# Patient Record
Sex: FEMALE | Race: WHITE | NOT HISPANIC OR LATINO | Employment: OTHER | URBAN - METROPOLITAN AREA
[De-identification: names, ages, dates, MRNs, and addresses within clinical notes are randomized per-mention and may not be internally consistent; named-entity substitution may affect disease eponyms.]

---

## 2023-02-17 PROBLEM — M25.471 RIGHT ANKLE SWELLING: Status: ACTIVE | Noted: 2023-02-17

## 2023-02-17 PROBLEM — E66.01 MORBID OBESITY (MULTI): Status: ACTIVE | Noted: 2023-02-17

## 2023-02-17 PROBLEM — L98.9 SKIN LESIONS: Status: ACTIVE | Noted: 2023-02-17

## 2023-02-17 PROBLEM — J34.89 SORE IN NOSTRIL: Status: ACTIVE | Noted: 2023-02-17

## 2023-02-17 PROBLEM — G56.03 BILATERAL CARPAL TUNNEL SYNDROME: Status: ACTIVE | Noted: 2023-02-17

## 2023-02-17 PROBLEM — R06.2 WHEEZING: Status: ACTIVE | Noted: 2023-02-17

## 2023-02-17 PROBLEM — J06.9 ACUTE UPPER RESPIRATORY INFECTION: Status: ACTIVE | Noted: 2023-02-17

## 2023-02-17 PROBLEM — R68.89 FLU-LIKE SYMPTOMS: Status: ACTIVE | Noted: 2023-02-17

## 2023-02-17 PROBLEM — R22.1 LUMP IN THROAT: Status: ACTIVE | Noted: 2023-02-17

## 2023-02-17 PROBLEM — R79.89 ABNORMAL TSH: Status: ACTIVE | Noted: 2023-02-17

## 2023-02-17 PROBLEM — I10 BENIGN ESSENTIAL HYPERTENSION: Status: ACTIVE | Noted: 2023-02-17

## 2023-02-17 PROBLEM — M25.571 RIGHT ANKLE PAIN: Status: ACTIVE | Noted: 2023-02-17

## 2023-02-17 PROBLEM — E66.9 OBESITY: Status: ACTIVE | Noted: 2023-02-17

## 2023-02-17 PROBLEM — J32.9 SINUSITIS: Status: ACTIVE | Noted: 2023-02-17

## 2023-02-17 PROBLEM — E78.5 HYPERLIPIDEMIA: Status: ACTIVE | Noted: 2023-02-17

## 2023-02-17 PROBLEM — N93.9 VAGINAL BLEEDING, ABNORMAL: Status: ACTIVE | Noted: 2023-02-17

## 2023-02-17 PROBLEM — E66.813 CLASS 3 SEVERE OBESITY DUE TO EXCESS CALORIES WITH BODY MASS INDEX (BMI) OF 50.0 TO 59.9 IN ADULT: Status: ACTIVE | Noted: 2023-02-17

## 2023-02-17 PROBLEM — J01.90 ACUTE SINUSITIS: Status: ACTIVE | Noted: 2023-02-17

## 2023-02-17 PROBLEM — T75.3XXA MOTION SICKNESS: Status: ACTIVE | Noted: 2023-02-17

## 2023-02-17 PROBLEM — R73.9 HYPERGLYCEMIA: Status: ACTIVE | Noted: 2023-02-17

## 2023-02-17 PROBLEM — E66.01 CLASS 3 SEVERE OBESITY DUE TO EXCESS CALORIES WITH BODY MASS INDEX (BMI) OF 50.0 TO 59.9 IN ADULT (MULTI): Status: ACTIVE | Noted: 2023-02-17

## 2023-02-17 RX ORDER — HYDROCHLOROTHIAZIDE 25 MG/1
1 TABLET ORAL DAILY
COMMUNITY
Start: 2013-03-16 | End: 2023-08-21

## 2023-02-17 RX ORDER — FLUTICASONE PROPIONATE 50 MCG
2 SPRAY, SUSPENSION (ML) NASAL 2 TIMES DAILY
COMMUNITY

## 2023-02-17 RX ORDER — MELATONIN 10 MG
CAPSULE ORAL
COMMUNITY

## 2023-02-17 RX ORDER — LISINOPRIL 20 MG/1
1 TABLET ORAL DAILY
COMMUNITY
Start: 2018-05-21 | End: 2024-01-08

## 2023-02-17 RX ORDER — ACETAMINOPHEN 500 MG
1 TABLET ORAL DAILY
COMMUNITY
Start: 2015-03-23

## 2023-03-24 LAB
ALANINE AMINOTRANSFERASE (SGPT) (U/L) IN SER/PLAS: 35 U/L (ref 7–45)
ALBUMIN (G/DL) IN SER/PLAS: 4.1 G/DL (ref 3.4–5)
ALKALINE PHOSPHATASE (U/L) IN SER/PLAS: 95 U/L (ref 33–110)
ANION GAP IN SER/PLAS: 15 MMOL/L (ref 10–20)
ASPARTATE AMINOTRANSFERASE (SGOT) (U/L) IN SER/PLAS: 22 U/L (ref 9–39)
BILIRUBIN TOTAL (MG/DL) IN SER/PLAS: 0.7 MG/DL (ref 0–1.2)
CALCIUM (MG/DL) IN SER/PLAS: 9.4 MG/DL (ref 8.6–10.3)
CARBON DIOXIDE, TOTAL (MMOL/L) IN SER/PLAS: 30 MMOL/L (ref 21–32)
CHLORIDE (MMOL/L) IN SER/PLAS: 99 MMOL/L (ref 98–107)
CHOLESTEROL (MG/DL) IN SER/PLAS: 217 MG/DL (ref 0–199)
CHOLESTEROL IN HDL (MG/DL) IN SER/PLAS: 42.8 MG/DL
CHOLESTEROL/HDL RATIO: 5.1
CREATININE (MG/DL) IN SER/PLAS: 0.84 MG/DL (ref 0.5–1.05)
ERYTHROCYTE DISTRIBUTION WIDTH (RATIO) BY AUTOMATED COUNT: 14 % (ref 11.5–14.5)
ERYTHROCYTE MEAN CORPUSCULAR HEMOGLOBIN CONCENTRATION (G/DL) BY AUTOMATED: 32.4 G/DL (ref 32–36)
ERYTHROCYTE MEAN CORPUSCULAR VOLUME (FL) BY AUTOMATED COUNT: 88 FL (ref 80–100)
ERYTHROCYTES (10*6/UL) IN BLOOD BY AUTOMATED COUNT: 5.11 X10E12/L (ref 4–5.2)
GFR FEMALE: 82 ML/MIN/1.73M2
GLUCOSE (MG/DL) IN SER/PLAS: 118 MG/DL (ref 74–99)
HEMATOCRIT (%) IN BLOOD BY AUTOMATED COUNT: 45.1 % (ref 36–46)
HEMOGLOBIN (G/DL) IN BLOOD: 14.6 G/DL (ref 12–16)
LDL: 133 MG/DL (ref 0–99)
LEUKOCYTES (10*3/UL) IN BLOOD BY AUTOMATED COUNT: 8.8 X10E9/L (ref 4.4–11.3)
NON HDL CHOLESTEROL: 174 MG/DL
PLATELETS (10*3/UL) IN BLOOD AUTOMATED COUNT: 317 X10E9/L (ref 150–450)
POTASSIUM (MMOL/L) IN SER/PLAS: 4 MMOL/L (ref 3.5–5.3)
PROTEIN TOTAL: 7 G/DL (ref 6.4–8.2)
SODIUM (MMOL/L) IN SER/PLAS: 140 MMOL/L (ref 136–145)
THYROTROPIN (MIU/L) IN SER/PLAS BY DETECTION LIMIT <= 0.05 MIU/L: 3.81 MIU/L (ref 0.44–3.98)
TRIGLYCERIDE (MG/DL) IN SER/PLAS: 207 MG/DL (ref 0–149)
UREA NITROGEN (MG/DL) IN SER/PLAS: 18 MG/DL (ref 6–23)
VLDL: 41 MG/DL (ref 0–40)

## 2023-03-31 ENCOUNTER — OFFICE VISIT (OUTPATIENT)
Dept: PRIMARY CARE | Facility: CLINIC | Age: 53
End: 2023-03-31
Payer: COMMERCIAL

## 2023-03-31 VITALS
TEMPERATURE: 97.1 F | BODY MASS INDEX: 52.56 KG/M2 | HEIGHT: 61 IN | HEART RATE: 98 BPM | WEIGHT: 278.4 LBS | SYSTOLIC BLOOD PRESSURE: 122 MMHG | RESPIRATION RATE: 16 BRPM | OXYGEN SATURATION: 99 % | DIASTOLIC BLOOD PRESSURE: 80 MMHG

## 2023-03-31 DIAGNOSIS — K76.0 STEATOSIS OF LIVER: Primary | ICD-10-CM

## 2023-03-31 DIAGNOSIS — Z00.00 HEALTHCARE MAINTENANCE: ICD-10-CM

## 2023-03-31 DIAGNOSIS — I10 BENIGN ESSENTIAL HYPERTENSION: ICD-10-CM

## 2023-03-31 DIAGNOSIS — E66.01 CLASS 3 SEVERE OBESITY DUE TO EXCESS CALORIES WITH SERIOUS COMORBIDITY AND BODY MASS INDEX (BMI) OF 50.0 TO 59.9 IN ADULT (MULTI): ICD-10-CM

## 2023-03-31 PROCEDURE — 3008F BODY MASS INDEX DOCD: CPT | Performed by: INTERNAL MEDICINE

## 2023-03-31 PROCEDURE — 3079F DIAST BP 80-89 MM HG: CPT | Performed by: INTERNAL MEDICINE

## 2023-03-31 PROCEDURE — 1036F TOBACCO NON-USER: CPT | Performed by: INTERNAL MEDICINE

## 2023-03-31 PROCEDURE — 99396 PREV VISIT EST AGE 40-64: CPT | Performed by: INTERNAL MEDICINE

## 2023-03-31 PROCEDURE — 3074F SYST BP LT 130 MM HG: CPT | Performed by: INTERNAL MEDICINE

## 2023-03-31 ASSESSMENT — ENCOUNTER SYMPTOMS
ENDOCRINE NEGATIVE: 1
DIARRHEA: 0
HEMATOLOGIC/LYMPHATIC NEGATIVE: 1
POLYDIPSIA: 0
FACIAL ASYMMETRY: 0
AGITATION: 0
DIZZINESS: 0
VOICE CHANGE: 0
EYE DISCHARGE: 0
EYES NEGATIVE: 1
NECK STIFFNESS: 0
JOINT SWELLING: 0
COUGH: 0
NUMBNESS: 0
DIFFICULTY URINATING: 0
DYSURIA: 0
EYE PAIN: 0
CONFUSION: 0
COLOR CHANGE: 0
FLANK PAIN: 0
UNEXPECTED WEIGHT CHANGE: 0
SEIZURES: 0
MUSCULOSKELETAL NEGATIVE: 1
OCCASIONAL FEELINGS OF UNSTEADINESS: 0
RESPIRATORY NEGATIVE: 1
HEADACHES: 0
CARDIOVASCULAR NEGATIVE: 1
PSYCHIATRIC NEGATIVE: 1
ACTIVITY CHANGE: 0
BLOOD IN STOOL: 0
ARTHRALGIAS: 0
DEPRESSION: 0
EYE REDNESS: 0
NERVOUS/ANXIOUS: 0
BRUISES/BLEEDS EASILY: 0
TROUBLE SWALLOWING: 0
MYALGIAS: 0
WEAKNESS: 0
ADENOPATHY: 0
TREMORS: 0
NECK PAIN: 0
NEUROLOGICAL NEGATIVE: 1
FREQUENCY: 0
LIGHT-HEADEDNESS: 0
WHEEZING: 0
NAUSEA: 0
PALPITATIONS: 0
LOSS OF SENSATION IN FEET: 0
VOMITING: 0
APPETITE CHANGE: 0
SPEECH DIFFICULTY: 0
GASTROINTESTINAL NEGATIVE: 1
SLEEP DISTURBANCE: 0
BACK PAIN: 0
SHORTNESS OF BREATH: 0
POLYPHAGIA: 0
SINUS PAIN: 0
HALLUCINATIONS: 0
SORE THROAT: 0
ABDOMINAL PAIN: 0
SINUS PRESSURE: 0
CONSTIPATION: 0
STRIDOR: 0
WOUND: 0
ALLERGIC/IMMUNOLOGIC NEGATIVE: 1
CHEST TIGHTNESS: 0
CONSTITUTIONAL NEGATIVE: 1

## 2023-03-31 ASSESSMENT — PATIENT HEALTH QUESTIONNAIRE - PHQ9
1. LITTLE INTEREST OR PLEASURE IN DOING THINGS: NOT AT ALL
2. FEELING DOWN, DEPRESSED OR HOPELESS: NOT AT ALL
SUM OF ALL RESPONSES TO PHQ9 QUESTIONS 1 AND 2: 0

## 2023-03-31 NOTE — PROGRESS NOTES
Subjective   Patient ID: Lexi Vanegas is a 53 y.o. female who presents for Annual Exam (Patient is here for a physical. ).  HPI    Patient has been feeling pretty good and has been complaint with prescribed medications.    We reviewed and discussed details of recent blood work: CBC, CMP, TSH, Lipid panel in march 2023.  Results within acceptable range.  Elevated glucose, TG, cholesterol noted.     Recent CT CAC score in 2023 was: 88  Additionally liver steatosis noted.     Last mammogram: jan 2023  Last Colonoscopy: 2021, Next 2026  PAP: F/up with GYN Dr. Mosqueda    Review of Systems   Constitutional: Negative.  Negative for activity change, appetite change and unexpected weight change.   HENT: Negative.  Negative for congestion, ear discharge, ear pain, hearing loss, mouth sores, nosebleeds, sinus pressure, sinus pain, sore throat, trouble swallowing and voice change.    Eyes: Negative.  Negative for pain, discharge, redness and visual disturbance.   Respiratory: Negative.  Negative for cough, chest tightness, shortness of breath, wheezing and stridor.    Cardiovascular: Negative.  Negative for chest pain, palpitations and leg swelling.   Gastrointestinal: Negative.  Negative for abdominal pain, blood in stool, constipation, diarrhea, nausea and vomiting.   Endocrine: Negative.  Negative for polydipsia, polyphagia and polyuria.   Genitourinary: Negative.  Negative for difficulty urinating, dysuria, flank pain, frequency and urgency.   Musculoskeletal: Negative.  Negative for arthralgias, back pain, gait problem, joint swelling, myalgias, neck pain and neck stiffness.   Skin: Negative.  Negative for color change, rash and wound.   Allergic/Immunologic: Negative.  Negative for environmental allergies, food allergies and immunocompromised state.   Neurological: Negative.  Negative for dizziness, tremors, seizures, syncope, facial asymmetry, speech difficulty, weakness, light-headedness, numbness and headaches.  "  Hematological: Negative.  Negative for adenopathy. Does not bruise/bleed easily.   Psychiatric/Behavioral: Negative.  Negative for agitation, behavioral problems, confusion, hallucinations, sleep disturbance and suicidal ideas. The patient is not nervous/anxious.    All other systems reviewed and are negative.      Objective     Review of systems was performed and all systems were negative except what in HPI    /80   Pulse 98   Temp 36.2 °C (97.1 °F)   Resp 16   Ht 1.543 m (5' 0.75\")   Wt 126 kg (278 lb 6.4 oz)   SpO2 99%   BMI 53.04 kg/m²    Physical Exam  Vitals and nursing note reviewed.   Constitutional:       General: She is not in acute distress.     Appearance: Normal appearance.   HENT:      Head: Normocephalic and atraumatic.      Right Ear: Tympanic membrane, ear canal and external ear normal.      Left Ear: Tympanic membrane, ear canal and external ear normal.      Nose: Nose normal. No congestion or rhinorrhea.   Eyes:      General:         Right eye: No discharge.         Left eye: No discharge.      Extraocular Movements: Extraocular movements intact.      Conjunctiva/sclera: Conjunctivae normal.      Pupils: Pupils are equal, round, and reactive to light.   Cardiovascular:      Rate and Rhythm: Normal rate and regular rhythm.      Pulses: Normal pulses.      Heart sounds: Normal heart sounds. No murmur heard.     No friction rub. No gallop.   Pulmonary:      Effort: Pulmonary effort is normal. No respiratory distress.      Breath sounds: Normal breath sounds. No stridor. No wheezing, rhonchi or rales.   Chest:      Chest wall: No tenderness.   Abdominal:      General: Bowel sounds are normal.      Palpations: Abdomen is soft. There is no mass.      Tenderness: There is no abdominal tenderness. There is no guarding or rebound.   Musculoskeletal:         General: No swelling or deformity. Normal range of motion.      Cervical back: Normal range of motion and neck supple. No rigidity or " tenderness.      Right lower leg: No edema.      Left lower leg: No edema.   Lymphadenopathy:      Cervical: No cervical adenopathy.   Skin:     General: Skin is warm and dry.      Coloration: Skin is not jaundiced.      Findings: No bruising or erythema.   Neurological:      General: No focal deficit present.      Mental Status: She is alert and oriented to person, place, and time. Mental status is at baseline.      Cranial Nerves: No cranial nerve deficit.      Motor: No weakness.      Coordination: Coordination normal.      Gait: Gait normal.   Psychiatric:         Mood and Affect: Mood normal.         Behavior: Behavior normal.         Thought Content: Thought content normal.         Judgment: Judgment normal.         Assessment/Plan   Problem List Items Addressed This Visit          Circulatory    Benign essential hypertension     Controlled. Continue same medications and DASH diet..            Digestive    Steatosis of liver - Primary    Relevant Orders    US abdomen limited liver       Endocrine/Metabolic    Class 3 severe obesity due to excess calories with body mass index (BMI) of 50.0 to 59.9 in adult (CMS/HCC)     Weight loss is advised. Target BMI: 25. Please follow low carbohydrate diet and exercise at least 150 minutes weekly.  Nutritional consultation is available, please let me know if interested.          BMI 50.0-59.9, adult (CMS/HCC)       Other    Healthcare maintenance     Weight loss is advised. Target BMI: 25. Please follow low carbohydrate diet and exercise at least 150 minutes weekly.  Nutritional consultation is available, please let me know if interested.         It was a pleasure to see you today.  I would like to remind you about importance of a healthy lifestyle in order to improve your well-being and live longer.  Try to engage in physical activities for at least 150 minutes per week.  Eat about 10 servings of fruits and vegetables daily. My advice is 2 servings of fruits and 8  servings of vegetables.  For vegetables choose at least half of them green and at least half of them fresh.  Please avoid sugar, salt, fried food and saturated fat.    F/up in 6 months or sooner if needed.

## 2023-04-01 PROBLEM — K76.0 STEATOSIS OF LIVER: Status: ACTIVE | Noted: 2023-04-01

## 2023-04-01 PROBLEM — Z00.00 HEALTHCARE MAINTENANCE: Status: ACTIVE | Noted: 2023-04-01

## 2023-04-01 NOTE — ASSESSMENT & PLAN NOTE
Weight loss is advised. Target BMI: 25. Please follow low carbohydrate diet and exercise at least 150 minutes weekly.  Nutritional consultation is available, please let me know if interested.

## 2023-05-14 DIAGNOSIS — K80.20 CALCULUS OF GALLBLADDER WITHOUT CHOLECYSTITIS WITHOUT OBSTRUCTION: ICD-10-CM

## 2023-05-14 DIAGNOSIS — K76.0 STEATOSIS OF LIVER: Primary | ICD-10-CM

## 2023-05-15 ENCOUNTER — TELEPHONE (OUTPATIENT)
Dept: PRIMARY CARE | Facility: CLINIC | Age: 53
End: 2023-05-15
Payer: COMMERCIAL

## 2023-05-15 NOTE — TELEPHONE ENCOUNTER
----- Message from Denise Gaming MD PhD sent at 5/14/2023  2:30 PM EDT -----  Please call the patient regarding her abnormal result.  Liver US showed fatty infiltration of liver, liver cyst and gallbladder stones.  Please consult hepatologist and surgeon for further evaluation of fatty liver and stones. I placed referral orders in Epic.

## 2023-08-10 LAB
ALANINE AMINOTRANSFERASE (SGPT) (U/L) IN SER/PLAS: 35 U/L (ref 7–45)
ALBUMIN (G/DL) IN SER/PLAS: 4.2 G/DL (ref 3.4–5)
ALKALINE PHOSPHATASE (U/L) IN SER/PLAS: 102 U/L (ref 33–110)
ASPARTATE AMINOTRANSFERASE (SGOT) (U/L) IN SER/PLAS: 23 U/L (ref 9–39)
BASOPHILS (10*3/UL) IN BLOOD BY AUTOMATED COUNT: 0.09 X10E9/L (ref 0–0.1)
BASOPHILS/100 LEUKOCYTES IN BLOOD BY AUTOMATED COUNT: 0.9 % (ref 0–2)
BILIRUBIN DIRECT (MG/DL) IN SER/PLAS: 0.1 MG/DL (ref 0–0.3)
BILIRUBIN TOTAL (MG/DL) IN SER/PLAS: 0.5 MG/DL (ref 0–1.2)
ERYTHROCYTE DISTRIBUTION WIDTH (RATIO) BY AUTOMATED COUNT: 14.1 % (ref 11.5–14.5)
ERYTHROCYTE MEAN CORPUSCULAR HEMOGLOBIN CONCENTRATION (G/DL) BY AUTOMATED: 34 G/DL (ref 32–36)
ERYTHROCYTE MEAN CORPUSCULAR VOLUME (FL) BY AUTOMATED COUNT: 86 FL (ref 80–100)
ERYTHROCYTES (10*6/UL) IN BLOOD BY AUTOMATED COUNT: 4.97 X10E12/L (ref 4–5.2)
HEMATOCRIT (%) IN BLOOD BY AUTOMATED COUNT: 42.9 % (ref 36–46)
HEMOGLOBIN (G/DL) IN BLOOD: 14.6 G/DL (ref 12–16)
HEPATITIS A TOTAL AB INTERPRETATION: NONREACTIVE
HEPATITIS B VIRUS CORE AB (PRESENCE) IN SER/PLAS BY IMM: NONREACTIVE
HEPATITIS B VIRUS SURFACE AB (MIU/ML) IN SERUM: <3.1 MIU/ML
HEPATITIS B VIRUS SURFACE AG PRESENCE IN SERUM: NONREACTIVE
HEPATITIS C VIRUS AB PRESENCE IN SERUM: NONREACTIVE
IMMATURE GRANULOCYTES/100 LEUKOCYTES IN BLOOD BY AUTOMATED COUNT: 0.2 % (ref 0–0.9)
LEUKOCYTES (10*3/UL) IN BLOOD BY AUTOMATED COUNT: 10.2 X10E9/L (ref 4.4–11.3)
LYMPHOCYTES (10*3/UL) IN BLOOD BY AUTOMATED COUNT: 2.81 X10E9/L (ref 1.2–4.8)
LYMPHOCYTES/100 LEUKOCYTES IN BLOOD BY AUTOMATED COUNT: 27.5 % (ref 13–44)
MONOCYTES (10*3/UL) IN BLOOD BY AUTOMATED COUNT: 0.56 X10E9/L (ref 0.1–1)
MONOCYTES/100 LEUKOCYTES IN BLOOD BY AUTOMATED COUNT: 5.5 % (ref 2–10)
NEUTROPHILS (10*3/UL) IN BLOOD BY AUTOMATED COUNT: 6.72 X10E9/L (ref 1.2–7.7)
NEUTROPHILS/100 LEUKOCYTES IN BLOOD BY AUTOMATED COUNT: 65.9 % (ref 40–80)
PLATELETS (10*3/UL) IN BLOOD AUTOMATED COUNT: 362 X10E9/L (ref 150–450)
PROTEIN TOTAL: 7.5 G/DL (ref 6.4–8.2)

## 2023-08-20 DIAGNOSIS — I10 BENIGN ESSENTIAL HYPERTENSION: Primary | ICD-10-CM

## 2023-08-21 RX ORDER — HYDROCHLOROTHIAZIDE 25 MG/1
25 TABLET ORAL DAILY
Qty: 90 TABLET | Refills: 3 | Status: SHIPPED | OUTPATIENT
Start: 2023-08-21

## 2023-09-29 ENCOUNTER — APPOINTMENT (OUTPATIENT)
Dept: PRIMARY CARE | Facility: CLINIC | Age: 53
End: 2023-09-29
Payer: COMMERCIAL

## 2023-10-06 ENCOUNTER — LAB (OUTPATIENT)
Dept: LAB | Facility: LAB | Age: 53
End: 2023-10-06
Payer: COMMERCIAL

## 2023-10-06 ENCOUNTER — OFFICE VISIT (OUTPATIENT)
Dept: PRIMARY CARE | Facility: CLINIC | Age: 53
End: 2023-10-06
Payer: COMMERCIAL

## 2023-10-06 VITALS
SYSTOLIC BLOOD PRESSURE: 117 MMHG | TEMPERATURE: 97 F | HEIGHT: 61 IN | DIASTOLIC BLOOD PRESSURE: 79 MMHG | WEIGHT: 271 LBS | RESPIRATION RATE: 16 BRPM | BODY MASS INDEX: 51.16 KG/M2 | OXYGEN SATURATION: 97 % | HEART RATE: 77 BPM

## 2023-10-06 DIAGNOSIS — E78.2 MIXED HYPERLIPIDEMIA: ICD-10-CM

## 2023-10-06 DIAGNOSIS — K76.0 STEATOSIS OF LIVER: ICD-10-CM

## 2023-10-06 DIAGNOSIS — Z00.00 HEALTHCARE MAINTENANCE: ICD-10-CM

## 2023-10-06 DIAGNOSIS — I10 BENIGN ESSENTIAL HYPERTENSION: ICD-10-CM

## 2023-10-06 DIAGNOSIS — Z23 NEED FOR INFLUENZA VACCINATION: Primary | ICD-10-CM

## 2023-10-06 DIAGNOSIS — R73.9 HYPERGLYCEMIA: ICD-10-CM

## 2023-10-06 DIAGNOSIS — K76.0 FATTY (CHANGE OF) LIVER, NOT ELSEWHERE CLASSIFIED: Primary | ICD-10-CM

## 2023-10-06 DIAGNOSIS — E66.01 CLASS 3 SEVERE OBESITY DUE TO EXCESS CALORIES WITH SERIOUS COMORBIDITY AND BODY MASS INDEX (BMI) OF 50.0 TO 59.9 IN ADULT (MULTI): ICD-10-CM

## 2023-10-06 PROCEDURE — 0014M MISCELLANEOUS LAB TEST: CPT

## 2023-10-06 PROCEDURE — 3008F BODY MASS INDEX DOCD: CPT | Performed by: INTERNAL MEDICINE

## 2023-10-06 PROCEDURE — 1036F TOBACCO NON-USER: CPT | Performed by: INTERNAL MEDICINE

## 2023-10-06 PROCEDURE — 90471 IMMUNIZATION ADMIN: CPT | Performed by: INTERNAL MEDICINE

## 2023-10-06 PROCEDURE — 36415 COLL VENOUS BLD VENIPUNCTURE: CPT

## 2023-10-06 PROCEDURE — 3074F SYST BP LT 130 MM HG: CPT | Performed by: INTERNAL MEDICINE

## 2023-10-06 PROCEDURE — 99214 OFFICE O/P EST MOD 30 MIN: CPT | Performed by: INTERNAL MEDICINE

## 2023-10-06 PROCEDURE — 90686 IIV4 VACC NO PRSV 0.5 ML IM: CPT | Performed by: INTERNAL MEDICINE

## 2023-10-06 PROCEDURE — 3078F DIAST BP <80 MM HG: CPT | Performed by: INTERNAL MEDICINE

## 2023-10-06 ASSESSMENT — ENCOUNTER SYMPTOMS
LOSS OF SENSATION IN FEET: 0
DEPRESSION: 0
OCCASIONAL FEELINGS OF UNSTEADINESS: 0

## 2023-10-06 ASSESSMENT — PATIENT HEALTH QUESTIONNAIRE - PHQ9
2. FEELING DOWN, DEPRESSED OR HOPELESS: NOT AT ALL
SUM OF ALL RESPONSES TO PHQ9 QUESTIONS 1 AND 2: 0
1. LITTLE INTEREST OR PLEASURE IN DOING THINGS: NOT AT ALL

## 2023-10-06 NOTE — PROGRESS NOTES
Subjective   Patient ID: Lexi Vanegas is a 53 y.o. female who presents for Follow-up (Patient is here for a follow up./No new concerns. ).  HPI    Patient has been feeling pretty good and has been complaint with prescribed medications.    She has been under a lot of family stress due her  illness and surgery.    Patient was diagnosed with fatty liver, saw hepatologist Dr. Walsh in September 2023, weight loss strongly recommended.   I reviewed consultation note.   Patient has been trying to lose weight, she signed up with , has been going to GYM and exercising few times per week, no CP, SOB with exertion         We reviewed and discussed details of recent blood work: CBC, CMP, TSH, Lipid panel, Hb A1c, Vit D done in 2023. Results within acceptable range.    Last colonoscopy:  2021  Last mammogram: 1/2023  CT CAC score: 88 (2022)  UD abd (3/2023): enlarged liver, diffuse steatosis, hepatic cyst, gallbladder stone    Review of Systems   Constitutional: Negative.  Negative for activity change, appetite change and unexpected weight change.   HENT: Negative.  Negative for congestion, ear discharge, ear pain, hearing loss, mouth sores, nosebleeds, sinus pressure, sinus pain, sore throat, trouble swallowing and voice change.    Eyes: Negative.  Negative for pain, discharge, redness and visual disturbance.   Respiratory: Negative.  Negative for cough, chest tightness, shortness of breath, wheezing and stridor.    Cardiovascular: Negative.  Negative for chest pain, palpitations and leg swelling.   Gastrointestinal: Negative.  Negative for abdominal pain, blood in stool, constipation, diarrhea, nausea and vomiting.   Endocrine: Negative.  Negative for polydipsia, polyphagia and polyuria.   Genitourinary: Negative.  Negative for difficulty urinating, dysuria, flank pain, frequency and urgency.   Musculoskeletal: Negative.  Negative for arthralgias, back pain, gait problem, joint swelling, myalgias,  "neck pain and neck stiffness.   Skin: Negative.  Negative for color change, rash and wound.   Allergic/Immunologic: Negative.  Negative for environmental allergies, food allergies and immunocompromised state.   Neurological: Negative.  Negative for dizziness, tremors, seizures, syncope, facial asymmetry, speech difficulty, weakness, light-headedness, numbness and headaches.   Hematological: Negative.  Negative for adenopathy. Does not bruise/bleed easily.   Psychiatric/Behavioral: Negative.  Negative for agitation, behavioral problems, confusion, hallucinations, sleep disturbance and suicidal ideas. The patient is not nervous/anxious.    All other systems reviewed and are negative.      Objective     Review of systems was performed and all systems were negative except what in HPI    /79   Pulse 77   Temp 36.1 °C (97 °F)   Resp 16   Ht 1.543 m (5' 0.75\")   Wt 123 kg (271 lb)   SpO2 97%   BMI 51.63 kg/m²    Physical Exam  Vitals and nursing note reviewed.   Constitutional:       General: She is not in acute distress.     Appearance: Normal appearance.   HENT:      Head: Normocephalic and atraumatic.      Right Ear: External ear normal.      Left Ear: External ear normal.      Nose: Nose normal. No congestion or rhinorrhea.   Eyes:      General:         Right eye: No discharge.         Left eye: No discharge.      Extraocular Movements: Extraocular movements intact.      Conjunctiva/sclera: Conjunctivae normal.      Pupils: Pupils are equal, round, and reactive to light.   Cardiovascular:      Rate and Rhythm: Normal rate and regular rhythm.      Pulses: Normal pulses.      Heart sounds: Normal heart sounds. No murmur heard.     No friction rub. No gallop.   Pulmonary:      Effort: Pulmonary effort is normal. No respiratory distress.      Breath sounds: Normal breath sounds. No stridor. No wheezing, rhonchi or rales.   Chest:      Chest wall: No tenderness.   Abdominal:      General: Bowel sounds are " normal.      Palpations: Abdomen is soft. There is no mass.      Tenderness: There is no abdominal tenderness. There is no guarding or rebound.   Musculoskeletal:         General: No swelling or deformity. Normal range of motion.      Cervical back: Normal range of motion and neck supple. No rigidity or tenderness.      Right lower leg: No edema.      Left lower leg: No edema.   Lymphadenopathy:      Cervical: No cervical adenopathy.   Skin:     General: Skin is warm and dry.      Coloration: Skin is not jaundiced.      Findings: No bruising or erythema.   Neurological:      General: No focal deficit present.      Mental Status: She is alert and oriented to person, place, and time. Mental status is at baseline.      Cranial Nerves: No cranial nerve deficit.      Motor: No weakness.      Coordination: Coordination normal.      Gait: Gait normal.   Psychiatric:         Mood and Affect: Mood normal.         Behavior: Behavior normal.         Thought Content: Thought content normal.         Judgment: Judgment normal.         Assessment/Plan   Problem List Items Addressed This Visit             ICD-10-CM       Cardiac and Vasculature    Benign essential hypertension I10     Controlled. Continue same medications and DASH diet..         Hyperlipidemia E78.5     Low cholesterol and low carbohydrate diet is advised.             Endocrine/Metabolic    Hyperglycemia R73.9     Low cholesterol and low carbohydrate diet is advised.          Relevant Orders    Hemoglobin A1C    Class 3 severe obesity due to excess calories with body mass index (BMI) of 50.0 to 59.9 in adult (CMS/HCC) E66.01, Z68.43     Weight loss is advised. Target BMI: 25. Please follow low carbohydrate diet and exercise at least 150 minutes weekly.  Nutritional consultation is available, please let me know if interested.             Gastrointestinal and Abdominal    Steatosis of liver K76.0     Low cholesterol and low carbohydrate diet is advised.   F/up with  hepatologist Dr. Walsh.            Health Encounters    Healthcare maintenance Z00.00    Relevant Orders    CBC    Comprehensive Metabolic Panel    Lipid Panel    TSH with reflex to Free T4 if abnormal     Other Visit Diagnoses         Codes    Need for influenza vaccination    -  Primary Z23    Relevant Orders    Flu vaccine (IIV4) age 6 months and greater, preservative free (Completed)        It was a pleasure to see you today.  I would like to remind you about importance of a healthy lifestyle in order to improve your well-being and live longer.  Try to engage in physical activities for at least 150 minutes per week.  Eat about 10 servings of fruits and vegetables daily. My advice is 2 servings of fruits and 8 servings of vegetables.  For vegetables choose at least half of them green and at least half of them fresh.  Please avoid sugar, salt, fried food and saturated fat.    F/up in 6 months or sooner if needed.

## 2023-10-08 ASSESSMENT — ENCOUNTER SYMPTOMS
CONFUSION: 0
LIGHT-HEADEDNESS: 0
ABDOMINAL PAIN: 0
UNEXPECTED WEIGHT CHANGE: 0
MYALGIAS: 0
BRUISES/BLEEDS EASILY: 0
ALLERGIC/IMMUNOLOGIC NEGATIVE: 1
EYE PAIN: 0
APPETITE CHANGE: 0
RESPIRATORY NEGATIVE: 1
EYE DISCHARGE: 0
HALLUCINATIONS: 0
CONSTITUTIONAL NEGATIVE: 1
WHEEZING: 0
COUGH: 0
HEADACHES: 0
SINUS PRESSURE: 0
TROUBLE SWALLOWING: 0
ENDOCRINE NEGATIVE: 1
WOUND: 0
DIARRHEA: 0
MUSCULOSKELETAL NEGATIVE: 1
SEIZURES: 0
SHORTNESS OF BREATH: 0
EYE REDNESS: 0
WEAKNESS: 0
POLYPHAGIA: 0
SINUS PAIN: 0
PALPITATIONS: 0
FREQUENCY: 0
FACIAL ASYMMETRY: 0
GASTROINTESTINAL NEGATIVE: 1
BLOOD IN STOOL: 0
AGITATION: 0
ARTHRALGIAS: 0
NUMBNESS: 0
NEUROLOGICAL NEGATIVE: 1
PSYCHIATRIC NEGATIVE: 1
FLANK PAIN: 0
VOICE CHANGE: 0
DIZZINESS: 0
COLOR CHANGE: 0
SORE THROAT: 0
CARDIOVASCULAR NEGATIVE: 1
STRIDOR: 0
POLYDIPSIA: 0
JOINT SWELLING: 0
TREMORS: 0
ADENOPATHY: 0
CONSTIPATION: 0
ACTIVITY CHANGE: 0
CHEST TIGHTNESS: 0
NAUSEA: 0
NERVOUS/ANXIOUS: 0
NECK STIFFNESS: 0
NECK PAIN: 0
SPEECH DIFFICULTY: 0
SLEEP DISTURBANCE: 0
HEMATOLOGIC/LYMPHATIC NEGATIVE: 1
EYES NEGATIVE: 1
BACK PAIN: 0
VOMITING: 0
DIFFICULTY URINATING: 0
DYSURIA: 0

## 2023-10-12 LAB — SCAN RESULT: NORMAL

## 2023-10-31 DIAGNOSIS — K76.0 STEATOSIS OF LIVER: Primary | ICD-10-CM

## 2023-10-31 NOTE — RESULT ENCOUNTER NOTE
Hepatology Nurse Coordinator Note  Called patient to review their most recent results and recommendations from Dr. Walsh. Patient is aware her ELF predicts a low risk score for advanced liver fibrosis. She's aware the fibroscan she had done had predicted F3 fibrosis. She's aware, due to the discrepancy of results, that Dr. Walsh is recommending a liver biopsy to further evaluate fibrosis.  Discussed with patient that if she is reluctant to proceed with the liver biopsy, then Dr. Walsh is recommending LFT check every 6 months with repeat elastography in 1 year.     Patient verbalized that unless Dr. Walsh thinks that the biopsy is completely necessary, she would rather proceed with lab monitoring and a fibroscan, or elastography in the future.  She also asked if she can do it sooner than 1 year. Discussed with Dr. Walsh. Per MD, ok to hold off on biopsy and proceed with blood monitoring and repeat Elastography, but would keep blood work at 6 month check and elastography would be ok to repeat in 9 months from September (previous Fibroscan). Patient made aware.     Patient is aware her labs will be due: February 2024    Patient verbalized understanding of results and recommendations.  Patient was provided RN coordinators contact information should they have any additional questions, or concerns.

## 2024-01-07 DIAGNOSIS — I10 BENIGN ESSENTIAL HYPERTENSION: Primary | ICD-10-CM

## 2024-01-08 RX ORDER — LISINOPRIL 20 MG/1
20 TABLET ORAL DAILY
Qty: 90 TABLET | Refills: 3 | Status: SHIPPED | OUTPATIENT
Start: 2024-01-08

## 2024-03-01 ENCOUNTER — LAB (OUTPATIENT)
Dept: LAB | Facility: LAB | Age: 54
End: 2024-03-01
Payer: COMMERCIAL

## 2024-03-01 DIAGNOSIS — R73.9 HYPERGLYCEMIA: ICD-10-CM

## 2024-03-01 DIAGNOSIS — K76.0 STEATOSIS OF LIVER: ICD-10-CM

## 2024-03-01 DIAGNOSIS — Z00.00 HEALTHCARE MAINTENANCE: ICD-10-CM

## 2024-03-01 LAB
ALBUMIN SERPL BCP-MCNC: 4.4 G/DL (ref 3.4–5)
ALP SERPL-CCNC: 105 U/L (ref 33–110)
ALT SERPL W P-5'-P-CCNC: 30 U/L (ref 7–45)
ANION GAP SERPL CALC-SCNC: 13 MMOL/L (ref 10–20)
AST SERPL W P-5'-P-CCNC: 18 U/L (ref 9–39)
BILIRUB DIRECT SERPL-MCNC: 0.1 MG/DL (ref 0–0.3)
BILIRUB SERPL-MCNC: 0.7 MG/DL (ref 0–1.2)
BUN SERPL-MCNC: 15 MG/DL (ref 6–23)
CALCIUM SERPL-MCNC: 9.5 MG/DL (ref 8.6–10.3)
CHLORIDE SERPL-SCNC: 99 MMOL/L (ref 98–107)
CHOLEST SERPL-MCNC: 240 MG/DL (ref 0–199)
CHOLESTEROL/HDL RATIO: 5
CO2 SERPL-SCNC: 31 MMOL/L (ref 21–32)
CREAT SERPL-MCNC: 0.89 MG/DL (ref 0.5–1.05)
EGFRCR SERPLBLD CKD-EPI 2021: 76 ML/MIN/1.73M*2
ERYTHROCYTE [DISTWIDTH] IN BLOOD BY AUTOMATED COUNT: 14 % (ref 11.5–14.5)
EST. AVERAGE GLUCOSE BLD GHB EST-MCNC: 117 MG/DL
GLUCOSE SERPL-MCNC: 123 MG/DL (ref 74–99)
HBA1C MFR BLD: 5.7 %
HCT VFR BLD AUTO: 46 % (ref 36–46)
HDLC SERPL-MCNC: 47.6 MG/DL
HGB BLD-MCNC: 15.2 G/DL (ref 12–16)
LDLC SERPL CALC-MCNC: 158 MG/DL
MCH RBC QN AUTO: 29.1 PG (ref 26–34)
MCHC RBC AUTO-ENTMCNC: 33 G/DL (ref 32–36)
MCV RBC AUTO: 88 FL (ref 80–100)
NON HDL CHOLESTEROL: 192 MG/DL (ref 0–149)
NRBC BLD-RTO: 0 /100 WBCS (ref 0–0)
PLATELET # BLD AUTO: 359 X10*3/UL (ref 150–450)
POTASSIUM SERPL-SCNC: 4.3 MMOL/L (ref 3.5–5.3)
PROT SERPL-MCNC: 7 G/DL (ref 6.4–8.2)
RBC # BLD AUTO: 5.23 X10*6/UL (ref 4–5.2)
SODIUM SERPL-SCNC: 139 MMOL/L (ref 136–145)
TRIGL SERPL-MCNC: 171 MG/DL (ref 0–149)
TSH SERPL-ACNC: 3.66 MIU/L (ref 0.44–3.98)
VLDL: 34 MG/DL (ref 0–40)
WBC # BLD AUTO: 8.9 X10*3/UL (ref 4.4–11.3)

## 2024-03-01 PROCEDURE — 85027 COMPLETE CBC AUTOMATED: CPT

## 2024-03-01 PROCEDURE — 84443 ASSAY THYROID STIM HORMONE: CPT

## 2024-03-01 PROCEDURE — 82248 BILIRUBIN DIRECT: CPT

## 2024-03-01 PROCEDURE — 80053 COMPREHEN METABOLIC PANEL: CPT

## 2024-03-01 PROCEDURE — 80061 LIPID PANEL: CPT

## 2024-03-01 PROCEDURE — 83036 HEMOGLOBIN GLYCOSYLATED A1C: CPT

## 2024-03-01 PROCEDURE — 36415 COLL VENOUS BLD VENIPUNCTURE: CPT

## 2024-03-08 ENCOUNTER — OFFICE VISIT (OUTPATIENT)
Dept: PRIMARY CARE | Facility: CLINIC | Age: 54
End: 2024-03-08
Payer: COMMERCIAL

## 2024-03-08 VITALS
WEIGHT: 259.8 LBS | HEIGHT: 61 IN | SYSTOLIC BLOOD PRESSURE: 135 MMHG | HEART RATE: 98 BPM | RESPIRATION RATE: 16 BRPM | BODY MASS INDEX: 49.05 KG/M2 | TEMPERATURE: 97 F | OXYGEN SATURATION: 96 % | DIASTOLIC BLOOD PRESSURE: 87 MMHG

## 2024-03-08 DIAGNOSIS — I10 BENIGN ESSENTIAL HYPERTENSION: ICD-10-CM

## 2024-03-08 DIAGNOSIS — K76.0 STEATOSIS OF LIVER: ICD-10-CM

## 2024-03-08 DIAGNOSIS — E78.2 MIXED HYPERLIPIDEMIA: Primary | ICD-10-CM

## 2024-03-08 DIAGNOSIS — E66.01 CLASS 3 SEVERE OBESITY DUE TO EXCESS CALORIES WITH SERIOUS COMORBIDITY AND BODY MASS INDEX (BMI) OF 45.0 TO 49.9 IN ADULT (MULTI): ICD-10-CM

## 2024-03-08 DIAGNOSIS — R73.9 HYPERGLYCEMIA: ICD-10-CM

## 2024-03-08 PROBLEM — J32.9 SINUSITIS: Status: RESOLVED | Noted: 2023-02-17 | Resolved: 2024-03-08

## 2024-03-08 PROBLEM — R22.1 LUMP IN THROAT: Status: RESOLVED | Noted: 2023-02-17 | Resolved: 2024-03-08

## 2024-03-08 PROBLEM — J06.9 ACUTE UPPER RESPIRATORY INFECTION: Status: RESOLVED | Noted: 2023-02-17 | Resolved: 2024-03-08

## 2024-03-08 PROBLEM — J34.89 SORE IN NOSTRIL: Status: RESOLVED | Noted: 2023-02-17 | Resolved: 2024-03-08

## 2024-03-08 PROBLEM — J01.90 ACUTE SINUSITIS: Status: RESOLVED | Noted: 2023-02-17 | Resolved: 2024-03-08

## 2024-03-08 PROCEDURE — 3079F DIAST BP 80-89 MM HG: CPT | Performed by: INTERNAL MEDICINE

## 2024-03-08 PROCEDURE — 1036F TOBACCO NON-USER: CPT | Performed by: INTERNAL MEDICINE

## 2024-03-08 PROCEDURE — 3008F BODY MASS INDEX DOCD: CPT | Performed by: INTERNAL MEDICINE

## 2024-03-08 PROCEDURE — 99214 OFFICE O/P EST MOD 30 MIN: CPT | Performed by: INTERNAL MEDICINE

## 2024-03-08 PROCEDURE — 3075F SYST BP GE 130 - 139MM HG: CPT | Performed by: INTERNAL MEDICINE

## 2024-03-08 ASSESSMENT — ENCOUNTER SYMPTOMS
EYE REDNESS: 0
DIZZINESS: 0
HEMATOLOGIC/LYMPHATIC NEGATIVE: 1
PSYCHIATRIC NEGATIVE: 1
EYE DISCHARGE: 0
PALPITATIONS: 0
OCCASIONAL FEELINGS OF UNSTEADINESS: 0
CONFUSION: 0
SINUS PAIN: 0
SEIZURES: 0
RESPIRATORY NEGATIVE: 1
ENDOCRINE NEGATIVE: 1
VOMITING: 0
HEADACHES: 0
GASTROINTESTINAL NEGATIVE: 1
ARTHRALGIAS: 0
SPEECH DIFFICULTY: 0
ABDOMINAL PAIN: 0
NECK STIFFNESS: 0
NEUROLOGICAL NEGATIVE: 1
BLOOD IN STOOL: 0
ADENOPATHY: 0
BRUISES/BLEEDS EASILY: 0
EYE PAIN: 0
LOSS OF SENSATION IN FEET: 0
CONSTITUTIONAL NEGATIVE: 1
WEAKNESS: 0
WHEEZING: 0
FACIAL ASYMMETRY: 0
FLANK PAIN: 0
CONSTIPATION: 0
JOINT SWELLING: 0
DIFFICULTY URINATING: 0
EYES NEGATIVE: 1
TREMORS: 0
POLYPHAGIA: 0
FREQUENCY: 0
CARDIOVASCULAR NEGATIVE: 1
SORE THROAT: 0
LIGHT-HEADEDNESS: 0
APPETITE CHANGE: 0
SLEEP DISTURBANCE: 0
POLYDIPSIA: 0
NECK PAIN: 0
UNEXPECTED WEIGHT CHANGE: 0
DIARRHEA: 0
ALLERGIC/IMMUNOLOGIC NEGATIVE: 1
NERVOUS/ANXIOUS: 0
SHORTNESS OF BREATH: 0
MYALGIAS: 0
VOICE CHANGE: 0
HALLUCINATIONS: 0
ACTIVITY CHANGE: 0
NAUSEA: 0
MUSCULOSKELETAL NEGATIVE: 1
TROUBLE SWALLOWING: 0
SINUS PRESSURE: 0
DYSURIA: 0
NUMBNESS: 0
CHEST TIGHTNESS: 0
WOUND: 0
AGITATION: 0
DEPRESSION: 0
COLOR CHANGE: 0
BACK PAIN: 0
COUGH: 0
STRIDOR: 0

## 2024-03-08 ASSESSMENT — PATIENT HEALTH QUESTIONNAIRE - PHQ9
1. LITTLE INTEREST OR PLEASURE IN DOING THINGS: NOT AT ALL
SUM OF ALL RESPONSES TO PHQ9 QUESTIONS 1 AND 2: 0
2. FEELING DOWN, DEPRESSED OR HOPELESS: NOT AT ALL

## 2024-03-08 NOTE — PROGRESS NOTES
Subjective   Patient ID: Lexi Vanegas is a 54 y.o. female who presents for Follow-up (Patient is here for a follow up./No new concerns. ).  HPI    Patient has been feeling pretty good and has been complaint with prescribed medications.    We reviewed and discussed details of recent blood work: CBC, CMP, TSH, Lipid panel, Hb A1c done in March 2024  Results within acceptable range except elevated cholesterol, TG and glucose.  hbA1c: 5.7.    Intentionally lost 20 lbs with diet and exercise.     We discussed replacing meat in her diet with fish and plant proteins.    Patient had an episode of dizziness few weeks ago which startd in am after getting up and resolved by noon.  Possibly related to dehydration, she went to gym evening before and did not drink fluids afterwards.    Review of Systems   Constitutional: Negative.  Negative for activity change, appetite change and unexpected weight change.   HENT: Negative.  Negative for congestion, ear discharge, ear pain, hearing loss, mouth sores, nosebleeds, sinus pressure, sinus pain, sore throat, trouble swallowing and voice change.    Eyes: Negative.  Negative for pain, discharge, redness and visual disturbance.   Respiratory: Negative.  Negative for cough, chest tightness, shortness of breath, wheezing and stridor.    Cardiovascular: Negative.  Negative for chest pain, palpitations and leg swelling.   Gastrointestinal: Negative.  Negative for abdominal pain, blood in stool, constipation, diarrhea, nausea and vomiting.   Endocrine: Negative.  Negative for polydipsia, polyphagia and polyuria.   Genitourinary: Negative.  Negative for difficulty urinating, dysuria, flank pain, frequency and urgency.   Musculoskeletal: Negative.  Negative for arthralgias, back pain, gait problem, joint swelling, myalgias, neck pain and neck stiffness.   Skin: Negative.  Negative for color change, rash and wound.   Allergic/Immunologic: Negative.  Negative for environmental allergies, food  "allergies and immunocompromised state.   Neurological: Negative.  Negative for dizziness, tremors, seizures, syncope, facial asymmetry, speech difficulty, weakness, light-headedness, numbness and headaches.   Hematological: Negative.  Negative for adenopathy. Does not bruise/bleed easily.   Psychiatric/Behavioral: Negative.  Negative for agitation, behavioral problems, confusion, hallucinations, sleep disturbance and suicidal ideas. The patient is not nervous/anxious.    All other systems reviewed and are negative.      Objective     Review of systems was performed and all systems were negative except what in HPI    /87   Pulse 98   Temp 36.1 °C (97 °F)   Resp 16   Ht 1.543 m (5' 0.75\")   Wt 118 kg (259 lb 12.8 oz)   SpO2 96%   BMI 49.49 kg/m²    Physical Exam  Vitals and nursing note reviewed.   Constitutional:       General: She is not in acute distress.     Appearance: Normal appearance.   HENT:      Head: Normocephalic and atraumatic.      Right Ear: External ear normal.      Left Ear: External ear normal.      Nose: Nose normal. No congestion or rhinorrhea.   Eyes:      General:         Right eye: No discharge.         Left eye: No discharge.      Extraocular Movements: Extraocular movements intact.      Conjunctiva/sclera: Conjunctivae normal.      Pupils: Pupils are equal, round, and reactive to light.   Cardiovascular:      Rate and Rhythm: Normal rate and regular rhythm.      Pulses: Normal pulses.      Heart sounds: Normal heart sounds. No murmur heard.     No friction rub. No gallop.   Pulmonary:      Effort: Pulmonary effort is normal. No respiratory distress.      Breath sounds: Normal breath sounds. No stridor. No wheezing, rhonchi or rales.   Chest:      Chest wall: No tenderness.   Abdominal:      General: Bowel sounds are normal.      Palpations: Abdomen is soft. There is no mass.      Tenderness: There is no abdominal tenderness. There is no guarding or rebound.   Musculoskeletal:    "      General: No swelling or deformity. Normal range of motion.      Cervical back: Normal range of motion and neck supple. No rigidity or tenderness.      Right lower leg: No edema.      Left lower leg: No edema.   Lymphadenopathy:      Cervical: No cervical adenopathy.   Skin:     General: Skin is warm and dry.      Coloration: Skin is not jaundiced.      Findings: No bruising or erythema.   Neurological:      General: No focal deficit present.      Mental Status: She is alert and oriented to person, place, and time. Mental status is at baseline.      Cranial Nerves: No cranial nerve deficit.      Motor: No weakness.      Coordination: Coordination normal.      Gait: Gait normal.   Psychiatric:         Mood and Affect: Mood normal.         Behavior: Behavior normal.         Thought Content: Thought content normal.         Judgment: Judgment normal.         Assessment/Plan   Problem List Items Addressed This Visit             ICD-10-CM       Cardiac and Vasculature    Benign essential hypertension I10     Controlled. Continue same medications and DASH diet..         Hyperlipidemia - Primary E78.5     Low cholesterol and low carbohydrate diet is advised.          Relevant Orders    Comprehensive Metabolic Panel    Lipid Panel       Endocrine/Metabolic    Hyperglycemia R73.9     Low cholesterol and low carbohydrate diet is advised.          Relevant Orders    Hemoglobin A1C    Obesity E66.9     Weight loss is advised. Target BMI: 25. Please follow low carbohydrate diet and exercise at least 150 minutes weekly.  Nutritional consultation is available, please let me know if interested.             Gastrointestinal and Abdominal    Steatosis of liver K76.0     Low cholesterol and low carbohydrate diet is advised.   F/up with hepatologist Dr. Walsh.          It was a pleasure to see you today.  I would like to remind you about importance of a healthy lifestyle in order to improve your well-being and live longer.  Try to  engage in physical activities for at least 150 minutes per week.  Eat about 10 servings of fruits and vegetables daily. My advice is 2 servings of fruits and 8 servings of vegetables.  For vegetables choose at least half of them green and at least half of them fresh.  Please avoid sugar, salt, fried food and saturated fat.    I spent a total of 30 minutes on the date of service for follow up visit, which included preparing to see the patient, face-to-face patient care, completing clinical documentation, obtaining and/or reviewing separately obtained history, performing a medically appropriate examination, counseling and educating the patient/family/caregiver, ordering medications, tests, or procedures, communicating with other health care providers (not separately reported), independently interpreting results (not separately reported), communicating results to the patient/family/caregiver, and care coordination (not separately reported).    F/up in 6 months or sooner if needed.

## 2024-03-18 ENCOUNTER — TELEMEDICINE (OUTPATIENT)
Dept: PRIMARY CARE | Facility: CLINIC | Age: 54
End: 2024-03-18
Payer: COMMERCIAL

## 2024-03-18 DIAGNOSIS — J01.90 ACUTE NON-RECURRENT SINUSITIS, UNSPECIFIED LOCATION: Primary | ICD-10-CM

## 2024-03-18 PROCEDURE — 99213 OFFICE O/P EST LOW 20 MIN: CPT | Performed by: NURSE PRACTITIONER

## 2024-03-18 PROCEDURE — 1036F TOBACCO NON-USER: CPT | Performed by: NURSE PRACTITIONER

## 2024-03-18 PROCEDURE — 3008F BODY MASS INDEX DOCD: CPT | Performed by: NURSE PRACTITIONER

## 2024-03-18 RX ORDER — AMOXICILLIN AND CLAVULANATE POTASSIUM 875; 125 MG/1; MG/1
875 TABLET, FILM COATED ORAL 2 TIMES DAILY
Qty: 20 TABLET | Refills: 0 | Status: SHIPPED | OUTPATIENT
Start: 2024-03-18 | End: 2024-03-28

## 2024-03-18 ASSESSMENT — ENCOUNTER SYMPTOMS
VOMITING: 0
WHEEZING: 0
SINUS PAIN: 1
COUGH: 1
NAUSEA: 0
DIARRHEA: 1
SWOLLEN GLANDS: 0
SORE THROAT: 1

## 2024-03-18 NOTE — PROGRESS NOTES
Subjective   Patient ID: Lexi Vanegas is a 54 y.o. female who presents for URI ([place] last Thursday pt started having sinus pressure, headache, drainage, facial tenderness, cough, sore throat. ).    URI   This is a new problem. The problem has been gradually worsening. The maximum temperature recorded prior to her arrival was 100.4 - 100.9 F. Associated symptoms include congestion, coughing, diarrhea, a plugged ear sensation, sinus pain and a sore throat. Pertinent negatives include no ear pain, nausea, sneezing, swollen glands, vomiting or wheezing. She has tried decongestant for the symptoms. The treatment provided mild relief.        Review of Systems   HENT:  Positive for congestion, sinus pain and sore throat. Negative for ear pain and sneezing.    Respiratory:  Positive for cough. Negative for wheezing.    Gastrointestinal:  Positive for diarrhea. Negative for nausea and vomiting.     otherwise negative aside from what was mentioned above in HPI.    Objective   There were no vitals taken for this visit.    Physical Exam  Constitutional:       Appearance: Normal appearance.   Neurological:      Mental Status: She is alert.   Psychiatric:         Mood and Affect: Mood normal.         Behavior: Behavior normal.         Thought Content: Thought content normal.         Judgment: Judgment normal.     This visit was completed via telephone/virtual visit. All issues as documented below were discussed and addressed but no physical exam was performed. If it was felt that the patient should be evaluated via  face-to-face office appointment(s) they were directed there. The patient verbally consented to this visit.      Assessment/Plan   Problem List Items Addressed This Visit    None  Visit Diagnoses         Codes    Acute non-recurrent sinusitis, unspecified location    -  Primary J01.90    Relevant Medications    amoxicillin-pot clavulanate (Augmentin) 875-125 mg tablet          Nasal rinses, humification/hot shower  until mucous drains, increase fluid intake aiming for half your body weight in oz of water daily. This will help to thin mucous secretion and replace fluids that have been lost.  Warm, moist air, nasal saline hydration, avoidance of nasal irritants including cigarette smoke.  Sip hot or warm drinks, this may soothe nasal passages  Avoid extremely cold and dry air  Tylenol/Advil for muscle aches and fever.  Warm salt water gargles, throat lozenges and popsicle's for any sore throat.    Return in 1 week if not improving.

## 2024-06-28 ENCOUNTER — TELEMEDICINE (OUTPATIENT)
Dept: PRIMARY CARE | Facility: CLINIC | Age: 54
End: 2024-06-28
Payer: COMMERCIAL

## 2024-06-28 DIAGNOSIS — J01.90 ACUTE NON-RECURRENT SINUSITIS, UNSPECIFIED LOCATION: Primary | ICD-10-CM

## 2024-06-28 DIAGNOSIS — J20.9 ACUTE BRONCHITIS, UNSPECIFIED ORGANISM: ICD-10-CM

## 2024-06-28 PROCEDURE — 3008F BODY MASS INDEX DOCD: CPT | Performed by: STUDENT IN AN ORGANIZED HEALTH CARE EDUCATION/TRAINING PROGRAM

## 2024-06-28 PROCEDURE — 99213 OFFICE O/P EST LOW 20 MIN: CPT | Performed by: STUDENT IN AN ORGANIZED HEALTH CARE EDUCATION/TRAINING PROGRAM

## 2024-06-28 PROCEDURE — 1036F TOBACCO NON-USER: CPT | Performed by: STUDENT IN AN ORGANIZED HEALTH CARE EDUCATION/TRAINING PROGRAM

## 2024-06-28 RX ORDER — METHYLPREDNISOLONE 4 MG/1
TABLET ORAL
Qty: 21 TABLET | Refills: 0 | Status: SHIPPED | OUTPATIENT
Start: 2024-06-28 | End: 2024-07-05

## 2024-06-28 RX ORDER — ALBUTEROL SULFATE 90 UG/1
1-2 AEROSOL, METERED RESPIRATORY (INHALATION) EVERY 4 HOURS PRN
Qty: 18 G | Refills: 0 | Status: SHIPPED | OUTPATIENT
Start: 2024-06-28 | End: 2025-06-28

## 2024-06-28 RX ORDER — AMOXICILLIN AND CLAVULANATE POTASSIUM 875; 125 MG/1; MG/1
875 TABLET, FILM COATED ORAL 2 TIMES DAILY
Qty: 14 TABLET | Refills: 0 | Status: SHIPPED | OUTPATIENT
Start: 2024-06-28 | End: 2024-07-05

## 2024-06-28 ASSESSMENT — ENCOUNTER SYMPTOMS
LIGHT-HEADEDNESS: 0
NAUSEA: 1
FEVER: 0
VOMITING: 0
DIARRHEA: 0
CHEST TIGHTNESS: 0
WHEEZING: 0
SINUS PRESSURE: 1
SHORTNESS OF BREATH: 0
COUGH: 1
DIAPHORESIS: 1
CHILLS: 0
SORE THROAT: 1
DIZZINESS: 0
SINUS PAIN: 1
RHINORRHEA: 1

## 2024-06-28 NOTE — PROGRESS NOTES
Subjective   Patient ID: Lexi Vanegas is a 54 y.o. female who presents for URI (X5 days pt has c.o cough, sinus pressure, drainage, throat irritation from PND./Neg covid at home yesterday ).  URI   Associated symptoms include coughing (mild sputum production clearish.), nausea (a/w PND), rhinorrhea, sinus pain and a sore throat (a/w PND). Pertinent negatives include no chest pain, diarrhea, ear pain (resolved and was hurting earlier in the week), vomiting or wheezing.       Virtual or Telephone Consent    An interactive audio and video telecommunication system which permits real time communications between the patient (at the originating site) and provider (at the distant site) was utilized to provide this telehealth service.   Verbal consent was requested and obtained from Lexi Vanegas on this date, 06/28/24 for a telehealth visit.     5 days of cough, sinus pressure, drainage, throat irritation with post nasal drip. Sudafed hasn't helped. She went to brother in law's group home party 6 days ago and people there have had sinus infections, pneumonia, bronchitis. She also has sick contacts at work. Home covid test was negative. Watery eyes and maxillary and frontal sinus pain and pressure.     Review of Systems   Constitutional:  Positive for diaphoresis (mild sweats at night). Negative for chills and fever.        +Mild subjective fever but did not take temp     HENT:  Positive for postnasal drip, rhinorrhea, sinus pressure, sinus pain and sore throat (a/w PND). Negative for ear discharge, ear pain (resolved and was hurting earlier in the week) and hearing loss.    Eyes:  Negative for visual disturbance.   Respiratory:  Positive for cough (mild sputum production clearish.). Negative for chest tightness, shortness of breath and wheezing.         Chest aching with coughing     Cardiovascular:  Negative for chest pain.   Gastrointestinal:  Positive for nausea (a/w PND). Negative for diarrhea and vomiting.    Neurological:  Negative for dizziness, syncope and light-headedness.       There were no vitals taken for this visit.    Objective   Physical Exam  Constitutional:       General: She is not in acute distress.  Pulmonary:      Effort: No respiratory distress.      Comments: Intermittent cough present  Neurological:      Mental Status: She is alert.         Assessment/Plan   Problem List Items Addressed This Visit    None  Visit Diagnoses       Acute non-recurrent sinusitis, unspecified location    -  Primary    Relevant Medications    amoxicillin-pot clavulanate (Augmentin) 875-125 mg tablet    Acute bronchitis, unspecified organism        Relevant Medications    methylPREDNISolone (Medrol Dospak) 4 mg tablets    albuterol 90 mcg/actuation inhaler        Acute sinusitis  Acute bronchitis  - Discussed symptoms seem most consistent with sinusitis, there could be component of bronchitis or possibly emphysema given the distant smoking history  - Augmentin sent to patient's pharmacy, can take a probiotic or yogurt to help with GI side effects  - Discussed to try Medrol pack in a few days if not much better.  Discussed can increase appetite, cause insomnia, agitation though helps reduce inflammation and may help with her symptoms.  - Albuterol as needed sent to pharmacy, discussed can cause heart racing and anxiety/jitteriness.  - ER for shortness of breath, chest pain, worsening or concerning signs or symptoms, pulse ox less than 90%  - Follow-up 1 to 2 weeks in person if not much better.  Otherwise follow-up with Dr. Fontenot as previously scheduled.

## 2024-07-08 ENCOUNTER — TELEPHONE (OUTPATIENT)
Dept: PRIMARY CARE | Facility: CLINIC | Age: 54
End: 2024-07-08
Payer: COMMERCIAL

## 2024-07-08 NOTE — TELEPHONE ENCOUNTER
Pt did a virtual with Dr. Gage and finished the antibiotic and steroid and is still having sinus issues and wanted to know what she can take OTC to help. Okay to leave a message

## 2024-07-24 ENCOUNTER — OFFICE VISIT (OUTPATIENT)
Dept: PRIMARY CARE | Facility: CLINIC | Age: 54
End: 2024-07-24
Payer: COMMERCIAL

## 2024-07-24 VITALS
HEIGHT: 61 IN | OXYGEN SATURATION: 99 % | HEART RATE: 96 BPM | TEMPERATURE: 97.4 F | DIASTOLIC BLOOD PRESSURE: 74 MMHG | RESPIRATION RATE: 16 BRPM | BODY MASS INDEX: 49.28 KG/M2 | WEIGHT: 261 LBS | SYSTOLIC BLOOD PRESSURE: 120 MMHG

## 2024-07-24 DIAGNOSIS — J01.10 ACUTE NON-RECURRENT FRONTAL SINUSITIS: Primary | ICD-10-CM

## 2024-07-24 PROCEDURE — 3078F DIAST BP <80 MM HG: CPT | Performed by: NURSE PRACTITIONER

## 2024-07-24 PROCEDURE — 99213 OFFICE O/P EST LOW 20 MIN: CPT | Performed by: NURSE PRACTITIONER

## 2024-07-24 PROCEDURE — 3074F SYST BP LT 130 MM HG: CPT | Performed by: NURSE PRACTITIONER

## 2024-07-24 PROCEDURE — 3008F BODY MASS INDEX DOCD: CPT | Performed by: NURSE PRACTITIONER

## 2024-07-24 PROCEDURE — 1036F TOBACCO NON-USER: CPT | Performed by: NURSE PRACTITIONER

## 2024-07-24 RX ORDER — DOXYCYCLINE 100 MG/1
100 CAPSULE ORAL 2 TIMES DAILY
Qty: 20 CAPSULE | Refills: 0 | Status: SHIPPED | OUTPATIENT
Start: 2024-07-24 | End: 2024-08-03

## 2024-07-24 NOTE — PROGRESS NOTES
"Subjective   Patient ID: Lexi Vanegas is a 54 y.o. female who presents for URI (X3 weeks pt has c.o cough, wheezing, sinus pressure, drainage, headache. Has been on antibiotics and steroids with some help ).    Patient of Dr. Fontenot.    She was initially treated with Augmentin x 7 days and steroid for URI.  Started to get better but never fully resolved. Was told to use Flonase which she started using astepro instead and been using twice daily and not helping. Her head feels very heavy now with lots of sinus pain, pressure and drainage. Continues to cough from post nasal drainage.    URI          Review of Systems  otherwise negative aside from what was mentioned above in HPI.    Objective   /74   Pulse 96   Temp 36.3 °C (97.4 °F) (Temporal)   Resp 16   Ht 1.543 m (5' 0.75\")   Wt 118 kg (261 lb)   SpO2 99%   BMI 49.72 kg/m²     Physical Exam  Constitutional:       Appearance: Normal appearance.   Cardiovascular:      Rate and Rhythm: Normal rate and regular rhythm.   Pulmonary:      Effort: Pulmonary effort is normal.      Breath sounds: Normal breath sounds.   Abdominal:      General: Abdomen is flat.   Skin:     General: Skin is warm.   Neurological:      General: No focal deficit present.      Mental Status: She is alert and oriented to person, place, and time. Mental status is at baseline.   Psychiatric:         Mood and Affect: Mood normal.         Behavior: Behavior normal.         Thought Content: Thought content normal.         Judgment: Judgment normal.         Assessment/Plan   Problem List Items Addressed This Visit    None  Visit Diagnoses         Codes    Acute non-recurrent frontal sinusitis    -  Primary J01.10    Relevant Medications    doxycycline (Vibramycin) 100 mg capsule        Switch from astepro to FlonaseNasal rinses, humification/hot shower until mucous drains, increase fluid intake aiming for half your body weight in oz of water daily. This will help to thin mucous secretion " and replace fluids that have been lost.  Warm, moist air, nasal saline hydration, avoidance of nasal irritants including cigarette smoke.  Sip hot or warm drinks, this may soothe nasal passages  Avoid extremely cold and dry air  Tylenol/Advil for muscle aches and fever.  Warm salt water gargles, throat lozenges and popsicle's for any sore throat.    Return in 1 week if not improving.

## 2024-08-23 ENCOUNTER — APPOINTMENT (OUTPATIENT)
Dept: GASTROENTEROLOGY | Facility: CLINIC | Age: 54
End: 2024-08-23
Payer: COMMERCIAL

## 2024-09-02 DIAGNOSIS — I10 BENIGN ESSENTIAL HYPERTENSION: ICD-10-CM

## 2024-09-02 RX ORDER — HYDROCHLOROTHIAZIDE 25 MG/1
25 TABLET ORAL DAILY
Qty: 90 TABLET | Refills: 3 | Status: SHIPPED | OUTPATIENT
Start: 2024-09-02

## 2024-09-20 ENCOUNTER — APPOINTMENT (OUTPATIENT)
Dept: PRIMARY CARE | Facility: CLINIC | Age: 54
End: 2024-09-20
Payer: COMMERCIAL

## 2024-09-20 ENCOUNTER — CLINICAL SUPPORT (OUTPATIENT)
Dept: GASTROENTEROLOGY | Facility: CLINIC | Age: 54
End: 2024-09-20
Payer: COMMERCIAL

## 2024-09-20 DIAGNOSIS — K76.0 STEATOSIS OF LIVER: ICD-10-CM

## 2024-09-20 PROCEDURE — 91200 LIVER ELASTOGRAPHY: CPT | Performed by: STUDENT IN AN ORGANIZED HEALTH CARE EDUCATION/TRAINING PROGRAM

## 2024-09-27 ENCOUNTER — LAB (OUTPATIENT)
Dept: LAB | Facility: LAB | Age: 54
End: 2024-09-27
Payer: COMMERCIAL

## 2024-09-27 ENCOUNTER — TELEPHONE (OUTPATIENT)
Dept: GASTROENTEROLOGY | Facility: HOSPITAL | Age: 54
End: 2024-09-27

## 2024-09-27 DIAGNOSIS — K76.0 STEATOSIS OF LIVER: ICD-10-CM

## 2024-09-27 LAB
ALBUMIN SERPL BCP-MCNC: 4.2 G/DL (ref 3.4–5)
ALP SERPL-CCNC: 96 U/L (ref 33–110)
ALT SERPL W P-5'-P-CCNC: 23 U/L (ref 7–45)
AST SERPL W P-5'-P-CCNC: 16 U/L (ref 9–39)
BILIRUB DIRECT SERPL-MCNC: 0.1 MG/DL (ref 0–0.3)
BILIRUB SERPL-MCNC: 0.5 MG/DL (ref 0–1.2)
PROT SERPL-MCNC: 6.5 G/DL (ref 6.4–8.2)

## 2024-09-27 PROCEDURE — 36415 COLL VENOUS BLD VENIPUNCTURE: CPT

## 2024-09-27 PROCEDURE — 80076 HEPATIC FUNCTION PANEL: CPT

## 2024-09-27 NOTE — TELEPHONE ENCOUNTER
Hepatology Nurse Coordinator Note - ADDENDUM  9/27/2024 10:59 AM  Called patient to review their most recent results and recommendations from Dr. Walsh. Provided education on fibrosis scale for fibroscan. Patient is aware her fibroscan demonstrates F0-1 fibrosis, which rules out significant fibrosis. She's aware there is evidence of steatosis.      Patient got her blood work today. She's aware Dr. Walsh will review all results in her upcoming appointment.     Patient verbalized understanding.       ----- Message from Ino Walsh sent at 9/20/2024 12:53 PM EDT -----  Please advise pt that fibroscan rules out risk of significant fibrosis- F0-1. Evidence of steatosis. Please advise pt to have LFT and ELF check done. Visit upcoming- Oct 1, 2024.    Self harming behaviour

## 2024-09-29 NOTE — PROGRESS NOTES
Hepatology: Follow up Office Note     Patient: Lexi Vanegas, a 54 y.o. year old female presents for follow up of steatotic liver disease.   PCP: Denise Gaming MD PhD    History of Present Illness   Lexi Vanegas presents for follow up of steatotic liver disease.     Initial visit in Aug 2023. She underwent a CT cardiac scoring test in December 2022 which picked up on findings of hepatic steatosis. After this she underwent testing with an ultrasound liver which also noted evidence of hepatic steatosis. No prior hx of liver ds or elevated liver enzymes. Pt felt well, without symptoms.      Today's visit: Here for a follow up visit.   After her initial visit last year, patient underwent further diagnostic testing.  This included fibrosis assessment with noninvasive testing.  Last year's FibroScan estimated stage III fibrosis and ELF predicted low risk score.  Given the discrepancy, liver biopsy was discussed, patient chose to defer invasive testing at the time.  She underwent a repeat FibroScan this year and had labs done recently. ELF is pending from labs. Feels well. Denies acute symptoms.      Review of Systems   Constitutional/ General: No fever, no night sweats, no weight loss  Eyes: no yellow discoloration  ENT: normal   Cardiovascular: no chest pain, no palpitations  Respiratory: no shortness of breath  Gastrointestinal: denies abdominal pain, nausea, vomiting  Integumentary: no rashes, no yellow discoloration of skin  Neurologic: no weakness or numbness of extremities  Psychiatric: no mood fluctuations  Musculoskeletal: no joint swelling   Genitourinary: no dysuria, no hematuria    All other systems have been reviewed and are negative except as noted in the HPI and above.    PMHx: HTN, HLD  Family history: denies known hx of cirrhosis or HCC in the family.   Social history: denies hx of alcohol use, prior hx of smoking, denies history of illicit substance use  PSH: denies hx of intra-abdominal  Patient called requesting his medication get refilled today.  If it's not going to, please call patient back to discuss further.   surgeries    Medications     Current Outpatient Medications   Medication Instructions    albuterol 90 mcg/actuation inhaler 1-2 puffs, inhalation, Every 4 hours PRN    cholecalciferol (Vitamin D-3) 50 mcg (2,000 unit) capsule 1 capsule, oral, Daily    fluticasone (Flonase) 50 mcg/actuation nasal spray 2 sprays, Each Nostril, 2 times daily    hydroCHLOROthiazide (HYDRODIURIL) 25 mg, oral, Daily    lisinopril 20 mg, oral, Daily    melatonin 10 mg capsule Melatonin 10 MG Oral Capsule   Refills: 0       Active      Physical Examination     Vitals:    10/01/24 1305   BP: 123/87   Pulse: 80   Temp: 36.5 °C (97.7 °F)     Vitals:    10/01/24 1305   Weight: 119 kg (263 lb)     Body mass index is 43.77 kg/m².    Constitutional: awake, alert   Eyes: EOMI, anicteric sclera  ENT: no oropharyngeal lesions visualized  Respiratory: Bilateral air entry equal no wheezing  Cardiovascular: regular rate and rhythm, no lower extremity edema  Abdomen: soft, non tender, non distended, bowel sounds present  Neurologic: gross motor strength intact   Psychiatric: alert, appropriate mood and affect, oriented to time/place/person    Labs     Lab Results   Component Value Date     03/01/2024    K 4.3 03/01/2024    CREATININE 0.89 03/01/2024    ALBUMIN 4.2 09/27/2024    ALT 23 09/27/2024    ALT 30 03/01/2024    ALT 35 08/10/2023    AST 16 09/27/2024    AST 18 03/01/2024    AST 23 08/10/2023    ALKPHOS 96 09/27/2024    ALKPHOS 105 03/01/2024    ALKPHOS 102 08/10/2023    HGB 15.2 03/01/2024     03/01/2024 Sept 2024: ELF result pending  Oct 2023: ELF 9.36    FIB-4 Calculation: 0.51 at 3/1/2024  8:04 AM  Calculated from:  SGOT/AST: 18 U/L at 3/1/2024  8:04 AM  SGPT/ALT: 30 U/L at 3/1/2024  8:04 AM  Platelets: 359 x10*3/uL at 3/1/2024  8:04 AM  Age: 54 years    Lab Results   Component Value Date    HEPBSAB <3.1 08/10/2023    HEPBCAB NONREACTIVE 08/10/2023    HEPCAB NONREACTIVE 08/10/2023     March 2023: ALT 35, AST 22, ALP 95, Bili  0.7, Na 140, creat 0.84, Plt 317, Hb 14.6, , Chol 217, HDL 41,  [FIB-4 from March 2023: 0.65]  March 2022: ALT 33, AST 20, ALP 92, Bili 0.6    Imaging   US liver May 2023: hepatomegaly with diffuse hepatic steatosis. 1.6cm right hepatic lobe simple cyst. Cholelithiasis without evidence of cholecystitis.     FibroSCAN Sept 2023: E 10.1kPa, IQR 11%. .   FibroSCAN Sept 2024: E 7.3kPa, IQR 13%. .     Assessment and Plan    Lexi Vanegas, a 54 y.o. year old female presents for follow up of steatotic liver disease. I have reviewed pertinent provider notes, labs and imaging studies. Discussed results and their interpretation with the patient today.    Encounter Diagnosis   Name Primary?    Metabolic dysfunction-associated steatotic liver disease (MASLD) Yes     Orders Placed This Encounter   Procedures    Hepatic Function Panel      # MASLD  The etiology of hepatic steatosis resulting in hepatomegaly noted on ultrasound liver imaging is likely secondary to metabolism associated steatotic liver disease.   Liver enzymes within normal range on most recent labs, and prior trend with normal to mild ALT elevation (<1.5 xULN)  She does not appear to have overt symptoms or signs of decompensated liver disease on evaluation today.  FIB-4 score is low.  Prior VCE with elevated LSM, predicting stage III fibrosis in 2023.  Given elevated BMI, limitations in use of VCE for accurate prediction of LSM.  ELF with a low risk score in 2023.  Patient underwent repeat NIT this year with VCE predicting no significant or advanced fibrosis.  ELF results pending.     -Recommend intermittent surveillance of liver enzymes every 6 to 12 monthly.      Discussed with patient pathophysiology of metabolic dysfunction associated steatotic liver disease. Discussed potential of disease progression to steatohepatitis, liver fibrosis, cirrhosis and risk of HCC.   Encourage patient to target weight loss of at least 7 to 10% body  weight in the next 6 to 12 months.      Discussed dietary modifications for fatty liver disease, increasing proportion of grains and vegetables, opting for leaner proteins and decreasing proportion of simple carbohydrates. Discussed exclusion/elimination of poor caloric value foods including sodas, cakes, candy, ice cream, crackers, chips etc. She has deferred consultation with nutritionist.    Patient is advised to avoid hepatotoxic agents including herbal supplements, alcohol use.     Follow up in 1 year.

## 2024-10-01 ENCOUNTER — OFFICE VISIT (OUTPATIENT)
Dept: GASTROENTEROLOGY | Facility: CLINIC | Age: 54
End: 2024-10-01
Payer: COMMERCIAL

## 2024-10-01 VITALS
HEART RATE: 80 BPM | TEMPERATURE: 97.7 F | BODY MASS INDEX: 43.82 KG/M2 | DIASTOLIC BLOOD PRESSURE: 87 MMHG | HEIGHT: 65 IN | WEIGHT: 263 LBS | SYSTOLIC BLOOD PRESSURE: 123 MMHG

## 2024-10-01 DIAGNOSIS — K76.0 METABOLIC DYSFUNCTION-ASSOCIATED STEATOTIC LIVER DISEASE (MASLD): Primary | ICD-10-CM

## 2024-10-01 PROCEDURE — 3008F BODY MASS INDEX DOCD: CPT | Performed by: STUDENT IN AN ORGANIZED HEALTH CARE EDUCATION/TRAINING PROGRAM

## 2024-10-01 PROCEDURE — 99213 OFFICE O/P EST LOW 20 MIN: CPT | Performed by: STUDENT IN AN ORGANIZED HEALTH CARE EDUCATION/TRAINING PROGRAM

## 2024-10-01 PROCEDURE — 3074F SYST BP LT 130 MM HG: CPT | Performed by: STUDENT IN AN ORGANIZED HEALTH CARE EDUCATION/TRAINING PROGRAM

## 2024-10-01 PROCEDURE — 3079F DIAST BP 80-89 MM HG: CPT | Performed by: STUDENT IN AN ORGANIZED HEALTH CARE EDUCATION/TRAINING PROGRAM

## 2024-10-01 NOTE — PATIENT INSTRUCTIONS
Lexi Vanegas,     Thank you for coming in for your hepatology office visit today.   As per our discussion, I recommend:     Pending results of the labs- ELF, will advise you on results and recommendations.   I anticipate lab check in 6 months.   Continue to implement nutritional and life style modification for fatty liver management. Ideally a weight loss goal of 7-10% in the next 12 months would be advised for fatty liver.     I would like to see you back in the office in 1 year.   If you are not provided with a date for your follow up visit at the end of your encounter today at the check out desk, I would encourage you to call [phone] to schedule your appointment with me.   If you have any trouble or need assistance with having this done, please reach out to my office staff at [phone] or my nurse coordinator at [phone] (Ms Bharati FUENTES).     I look forward to seeing you again. Thank you for allowing me to participate in your care.     Sincerely,  Ino Walsh MD  Hepatology

## 2024-10-03 LAB — SCAN RESULT: NORMAL

## 2024-10-04 ENCOUNTER — LAB (OUTPATIENT)
Dept: LAB | Facility: LAB | Age: 54
End: 2024-10-04
Payer: COMMERCIAL

## 2024-10-04 DIAGNOSIS — R73.9 HYPERGLYCEMIA: ICD-10-CM

## 2024-10-04 DIAGNOSIS — E78.2 MIXED HYPERLIPIDEMIA: ICD-10-CM

## 2024-10-04 LAB
ALBUMIN SERPL BCP-MCNC: 4.4 G/DL (ref 3.4–5)
ALP SERPL-CCNC: 93 U/L (ref 33–110)
ALT SERPL W P-5'-P-CCNC: 26 U/L (ref 7–45)
ANION GAP SERPL CALC-SCNC: 15 MMOL/L (ref 10–20)
AST SERPL W P-5'-P-CCNC: 17 U/L (ref 9–39)
BILIRUB SERPL-MCNC: 0.6 MG/DL (ref 0–1.2)
BUN SERPL-MCNC: 17 MG/DL (ref 6–23)
CALCIUM SERPL-MCNC: 9.5 MG/DL (ref 8.6–10.3)
CHLORIDE SERPL-SCNC: 101 MMOL/L (ref 98–107)
CHOLEST SERPL-MCNC: 244 MG/DL (ref 0–199)
CHOLESTEROL/HDL RATIO: 5.4
CO2 SERPL-SCNC: 27 MMOL/L (ref 21–32)
CREAT SERPL-MCNC: 0.81 MG/DL (ref 0.5–1.05)
EGFRCR SERPLBLD CKD-EPI 2021: 85 ML/MIN/1.73M*2
EST. AVERAGE GLUCOSE BLD GHB EST-MCNC: 117 MG/DL
GLUCOSE SERPL-MCNC: 132 MG/DL (ref 74–99)
HBA1C MFR BLD: 5.7 %
HDLC SERPL-MCNC: 45.3 MG/DL
LDLC SERPL CALC-MCNC: 163 MG/DL
NON HDL CHOLESTEROL: 199 MG/DL (ref 0–149)
POTASSIUM SERPL-SCNC: 4 MMOL/L (ref 3.5–5.3)
PROT SERPL-MCNC: 7 G/DL (ref 6.4–8.2)
SODIUM SERPL-SCNC: 139 MMOL/L (ref 136–145)
TRIGL SERPL-MCNC: 178 MG/DL (ref 0–149)
VLDL: 36 MG/DL (ref 0–40)

## 2024-10-04 PROCEDURE — 80053 COMPREHEN METABOLIC PANEL: CPT

## 2024-10-04 PROCEDURE — 83036 HEMOGLOBIN GLYCOSYLATED A1C: CPT

## 2024-10-04 PROCEDURE — 80061 LIPID PANEL: CPT

## 2024-10-04 PROCEDURE — 36415 COLL VENOUS BLD VENIPUNCTURE: CPT

## 2024-10-06 NOTE — RESULT ENCOUNTER NOTE
Your recent lab work was acceptable except elevated glucose, Hba1c, cholesterol, Tg.  Low cholesterol and low carbohydrate diet is advised.   All results may not be within normal range but they are not clinically significant at this time and do not require change in your therapy. We will discuss details during your next office visit. Please keep your next appointment as scheduled. Dr. Fontenot

## 2024-10-11 ENCOUNTER — APPOINTMENT (OUTPATIENT)
Dept: PRIMARY CARE | Facility: CLINIC | Age: 54
End: 2024-10-11
Payer: COMMERCIAL

## 2024-10-11 VITALS
HEART RATE: 98 BPM | TEMPERATURE: 98 F | OXYGEN SATURATION: 99 % | WEIGHT: 264.2 LBS | SYSTOLIC BLOOD PRESSURE: 132 MMHG | DIASTOLIC BLOOD PRESSURE: 60 MMHG | BODY MASS INDEX: 44.02 KG/M2 | RESPIRATION RATE: 16 BRPM | HEIGHT: 65 IN

## 2024-10-11 DIAGNOSIS — E66.813 CLASS 3 SEVERE OBESITY DUE TO EXCESS CALORIES WITH SERIOUS COMORBIDITY AND BODY MASS INDEX (BMI) OF 50.0 TO 59.9 IN ADULT: ICD-10-CM

## 2024-10-11 DIAGNOSIS — Z23 NEED FOR INFLUENZA VACCINATION: ICD-10-CM

## 2024-10-11 DIAGNOSIS — K76.0 STEATOSIS OF LIVER: ICD-10-CM

## 2024-10-11 DIAGNOSIS — I10 BENIGN ESSENTIAL HYPERTENSION: ICD-10-CM

## 2024-10-11 DIAGNOSIS — E66.01 CLASS 3 SEVERE OBESITY DUE TO EXCESS CALORIES WITH SERIOUS COMORBIDITY AND BODY MASS INDEX (BMI) OF 50.0 TO 59.9 IN ADULT: ICD-10-CM

## 2024-10-11 DIAGNOSIS — R73.9 HYPERGLYCEMIA: ICD-10-CM

## 2024-10-11 DIAGNOSIS — Z00.00 HEALTHCARE MAINTENANCE: Primary | ICD-10-CM

## 2024-10-11 DIAGNOSIS — E78.2 MIXED HYPERLIPIDEMIA: ICD-10-CM

## 2024-10-11 PROCEDURE — 3078F DIAST BP <80 MM HG: CPT | Performed by: INTERNAL MEDICINE

## 2024-10-11 PROCEDURE — 90471 IMMUNIZATION ADMIN: CPT | Performed by: INTERNAL MEDICINE

## 2024-10-11 PROCEDURE — 99396 PREV VISIT EST AGE 40-64: CPT | Performed by: INTERNAL MEDICINE

## 2024-10-11 PROCEDURE — 90656 IIV3 VACC NO PRSV 0.5 ML IM: CPT | Performed by: INTERNAL MEDICINE

## 2024-10-11 PROCEDURE — 93000 ELECTROCARDIOGRAM COMPLETE: CPT | Performed by: INTERNAL MEDICINE

## 2024-10-11 PROCEDURE — 3075F SYST BP GE 130 - 139MM HG: CPT | Performed by: INTERNAL MEDICINE

## 2024-10-11 PROCEDURE — 3008F BODY MASS INDEX DOCD: CPT | Performed by: INTERNAL MEDICINE

## 2024-10-11 ASSESSMENT — ENCOUNTER SYMPTOMS
ABDOMINAL PAIN: 0
LIGHT-HEADEDNESS: 0
CONFUSION: 0
CONSTITUTIONAL NEGATIVE: 1
NECK STIFFNESS: 0
MUSCULOSKELETAL NEGATIVE: 1
VOICE CHANGE: 0
EYE DISCHARGE: 0
EYE PAIN: 0
UNEXPECTED WEIGHT CHANGE: 0
HEADACHES: 0
HALLUCINATIONS: 0
NUMBNESS: 0
PALPITATIONS: 0
SINUS PAIN: 0
STRIDOR: 0
FREQUENCY: 0
BLOOD IN STOOL: 0
BACK PAIN: 0
POLYPHAGIA: 0
CONSTIPATION: 0
TREMORS: 0
RESPIRATORY NEGATIVE: 1
MYALGIAS: 0
ADENOPATHY: 0
PSYCHIATRIC NEGATIVE: 1
DIFFICULTY URINATING: 0
EYE REDNESS: 0
FLANK PAIN: 0
ACTIVITY CHANGE: 0
GASTROINTESTINAL NEGATIVE: 1
SINUS PRESSURE: 0
APPETITE CHANGE: 0
ENDOCRINE NEGATIVE: 1
WEAKNESS: 0
WHEEZING: 0
SHORTNESS OF BREATH: 0
COLOR CHANGE: 0
HEMATOLOGIC/LYMPHATIC NEGATIVE: 1
ALLERGIC/IMMUNOLOGIC NEGATIVE: 1
FACIAL ASYMMETRY: 0
EYES NEGATIVE: 1
NECK PAIN: 0
NERVOUS/ANXIOUS: 0
SLEEP DISTURBANCE: 0
DIARRHEA: 0
VOMITING: 0
DYSURIA: 0
JOINT SWELLING: 0
TROUBLE SWALLOWING: 0
SORE THROAT: 0
CHEST TIGHTNESS: 0
DIZZINESS: 0
NAUSEA: 0
POLYDIPSIA: 0
NEUROLOGICAL NEGATIVE: 1
BRUISES/BLEEDS EASILY: 0
AGITATION: 0
SEIZURES: 0
SPEECH DIFFICULTY: 0
COUGH: 0
CARDIOVASCULAR NEGATIVE: 1
WOUND: 0
ARTHRALGIAS: 0

## 2024-10-11 NOTE — PROGRESS NOTES
Subjective   Patient ID: Lexi Vanegas is a 54 y.o. female who presents for Annual Exam (Pt is here due to annual physical).  HPI    Patient comes for Physical Exam.    Patient has been feeling pretty good and has been complaint with prescribed medications.    We reviewed blood work including CBC, CMP, TSH, Lipid Panel, HbA1c done in March and 2024  Results within acceptable range except elevated glucose, cholesterol, TG and HbA1c.    Mammogram: at [facility]  ( we requested and received results)  Colon ca screenin, next   PAP: per GYN CELSO Hoffman    Patient has been trying to lose weight and has difficulties in getting weight down.    We discussed this subject at length, low carbohydrate and low calorie diet advised with goal of 1500 amber/day and 50g carbohydrates per day.       She has been under a lot of family stress,  trying to find some time for walking/exercise.    Review of Systems   Constitutional: Negative.  Negative for activity change, appetite change and unexpected weight change.   HENT: Negative.  Negative for congestion, ear discharge, ear pain, hearing loss, mouth sores, nosebleeds, sinus pressure, sinus pain, sore throat, trouble swallowing and voice change.    Eyes: Negative.  Negative for pain, discharge, redness and visual disturbance.   Respiratory: Negative.  Negative for cough, chest tightness, shortness of breath, wheezing and stridor.    Cardiovascular: Negative.  Negative for chest pain, palpitations and leg swelling.   Gastrointestinal: Negative.  Negative for abdominal pain, blood in stool, constipation, diarrhea, nausea and vomiting.   Endocrine: Negative.  Negative for polydipsia, polyphagia and polyuria.   Genitourinary: Negative.  Negative for difficulty urinating, dysuria, flank pain, frequency and urgency.   Musculoskeletal: Negative.  Negative for arthralgias, back pain, gait problem, joint swelling, myalgias, neck pain and neck stiffness.   Skin: Negative.  Negative  "for color change, rash and wound.   Allergic/Immunologic: Negative.  Negative for environmental allergies, food allergies and immunocompromised state.   Neurological: Negative.  Negative for dizziness, tremors, seizures, syncope, facial asymmetry, speech difficulty, weakness, light-headedness, numbness and headaches.   Hematological: Negative.  Negative for adenopathy. Does not bruise/bleed easily.   Psychiatric/Behavioral: Negative.  Negative for agitation, behavioral problems, confusion, hallucinations, sleep disturbance and suicidal ideas. The patient is not nervous/anxious.    All other systems reviewed and are negative.      Objective     Review of systems was performed and all systems were negative except what in HPI    /60 (BP Location: Left arm, Patient Position: Sitting, BP Cuff Size: Large adult)   Pulse 98   Temp 36.7 °C (98 °F) (Temporal)   Resp 16   Ht 1.651 m (5' 5\")   Wt 120 kg (264 lb 3.2 oz)   SpO2 99%   BMI 43.97 kg/m²    Physical Exam  Vitals and nursing note reviewed.   Constitutional:       General: She is not in acute distress.     Appearance: Normal appearance.   HENT:      Head: Normocephalic and atraumatic.      Right Ear: Tympanic membrane, ear canal and external ear normal.      Left Ear: Tympanic membrane, ear canal and external ear normal.      Nose: Nose normal. No congestion or rhinorrhea.      Mouth/Throat:      Mouth: Mucous membranes are moist.      Pharynx: Oropharynx is clear.   Eyes:      General:         Right eye: No discharge.         Left eye: No discharge.      Extraocular Movements: Extraocular movements intact.      Conjunctiva/sclera: Conjunctivae normal.      Pupils: Pupils are equal, round, and reactive to light.   Cardiovascular:      Rate and Rhythm: Normal rate and regular rhythm.      Pulses: Normal pulses.      Heart sounds: Normal heart sounds. No murmur heard.     No friction rub. No gallop.   Pulmonary:      Effort: Pulmonary effort is normal. No " respiratory distress.      Breath sounds: Normal breath sounds. No stridor. No wheezing, rhonchi or rales.   Chest:      Chest wall: No tenderness.   Abdominal:      General: Bowel sounds are normal.      Palpations: Abdomen is soft. There is no mass.      Tenderness: There is no abdominal tenderness. There is no guarding or rebound.   Musculoskeletal:         General: No swelling or deformity. Normal range of motion.      Cervical back: Normal range of motion and neck supple. No rigidity or tenderness.      Right lower leg: No edema.      Left lower leg: No edema.   Lymphadenopathy:      Cervical: No cervical adenopathy.   Skin:     General: Skin is warm and dry.      Coloration: Skin is not jaundiced.      Findings: No bruising or erythema.   Neurological:      General: No focal deficit present.      Mental Status: She is alert and oriented to person, place, and time. Mental status is at baseline.      Cranial Nerves: No cranial nerve deficit.      Motor: No weakness.      Coordination: Coordination normal.      Gait: Gait normal.   Psychiatric:         Mood and Affect: Mood normal.         Behavior: Behavior normal.         Thought Content: Thought content normal.         Judgment: Judgment normal.       Assessment/Plan   Problem List Items Addressed This Visit             ICD-10-CM       Cardiac and Vasculature    Benign essential hypertension I10     Controlled. Continue same medications and DASH diet..         Hyperlipidemia E78.5     Low cholesterol and low carbohydrate diet is advised.          Relevant Orders    ECG 12 lead (Clinic Performed) (Completed)       Endocrine/Metabolic    Hyperglycemia R73.9     Low cholesterol and low carbohydrate diet is advised.          Relevant Orders    Hemoglobin A1C       Gastrointestinal and Abdominal    Steatosis of liver K76.0     Low cholesterol and low carbohydrate diet is advised.   F/up with hepatologist Dr. Walsh.            [facility]  maintenance - Primary Z00.00    Relevant Orders    ECG 12 lead (Clinic Performed) (Completed)    CBC    Comprehensive Metabolic Panel    Lipid Panel    TSH with reflex to Free T4 if abnormal     Other Visit Diagnoses         Codes    Need for influenza vaccination     Z23    Relevant Orders    Flu vaccine, trivalent, preservative free, age 6 months and greater (Fluraix/Fluzone/Flulaval) (Completed)        It was a pleasure to see you today.  I would like to remind you about importance of a healthy lifestyle in order to improve your well-being and live longer.  Try to engage in physical activities for at least 150 minutes per week.  Eat about 10 servings of fruits and vegetables daily. My advice is 2 servings of fruits and 8 servings of vegetables.  For vegetables choose at least half of them green and at least half of them fresh.  Please avoid sugar, salt, fried food and saturated fat.    F/up in 6 month or sooner if needed.

## 2024-10-11 NOTE — ASSESSMENT & PLAN NOTE
Controlled. Continue same medications and DASH diet..   Referring provider: Tali Muñiz PA-C    Sobia Jacobo was seen 04/23/2018 for an audiological and vestibular evaluation.  Patient complains of dizziness of sudden onset, described as world is spinning with imbalance.  It lasts 30 seconds to one minute.  No provoking factors; occurs in any position upright or supine.  May occur with or without motion.  This has been recurring over the past two years.  She complains of pressure behind her left eye and head.  Denies hearing loss, noting equally sided hearing except at times she is having sinus problems.  During those times she notes fluctuations in her hearing.  Has occasional tinnitus AU recurring over the past year.  She admits to high salt and caffeine diet since January after starting a new job.  Prior she was watching her diet.  She takes diuretic as needed.  She takes potasium pill for low potassium.       Audiogram:    Electrocochleography (ECoghG):  ECochG test results were obtained utilizing insert TIPTrodes with a click stimulus rate of 11.3/sec presented at 95 dBnHL.  The average SP/AP ratio was .47 for the left ear.  The average SP/AP ratio was .29 for the right ear.    Summary: Utilizing an SP/AP criteria of .40 or greater as abnormal this finding is abnormal for the left ear, suggestive of Endolymphatic Hydrops (ELH) or Meniere's Disease (MD).   Tracings are scanned into computer.    Videonystagmography Report (VNG):  Oculomotor function tests (sinusoidal tracking, saccade, optokinetic) were normal and symmetric.  Spontaneous test was absent for nystagmus.  Gaze test was absent for nystagmus.  Head-shake test was absent for after head-shake nystagmus.  Static Positional test revealed <clinically insignificant> 3 d/s right-beating nystagmus to head right.  [place]-Hallpike Right was negative for BPPV.  [place]-Hallpike Left was negative for BPPV.  Bi-thermal caloric test was Normal.  Fixation suppression following caloric irrigations was normal.  The  following values were obtained:  Unilateral weakness (UW): 5% left ear  Directional preponderance (DP): 0%   RC: 21 d/s   d/s  RW: 25 d/s  LW: 23 d/s    Summary: Normal VNG.    Recommendations:  1. ENT  2. Discussed low salt, low caffeine and increased water diet; discussed food diary.   3. Annual audiograms to monitor asymmetry in hearing    Patient was counseled on the above findings.  Tracings are to be scanned.

## 2024-10-11 NOTE — ASSESSMENT & PLAN NOTE
History  Chief Complaint   Patient presents with    URI     Pt states cough, nasal congestion/runny, body aches  Patient presents to the emergency department for evaluation of nonproductive cough with generalized arthralgias and myalgias for the past 4-5 days  Patient has nasal congestion with clear rhinorrhea but denies throat or ear pain  There is no belly or back pain  No urinary symptoms  No rash  Patient has been taking Motrin as Tylenol has not helped  Patient denies nausea vomiting or diarrhea  Prior to Admission Medications   Prescriptions Last Dose Informant Patient Reported? Taking?   ibuprofen (MOTRIN) 800 mg tablet   Yes Yes   Sig: Take 800 mg by mouth as needed for mild pain   lidocaine viscous (XYLOCAINE) 2 % mucosal solution   No No   Sig: Soak onto cotton ball and apply to site for 10 minutes 4 times a day as needed  polyethylene glycol (MIRALAX) 17 g packet   No Yes   Sig: Take 17 g by mouth daily Prn constipation   ranitidine (ZANTAC) 150 mg tablet   Yes Yes   Sig: Take 150 mg by mouth 2 (two) times a day      Facility-Administered Medications: None       Past Medical History:   Diagnosis Date    Asthma     GERD (gastroesophageal reflux disease)     Hernia, abdominal     Migraines        Past Surgical History:   Procedure Laterality Date    FL RETROGRADE PYELOGRAM  8/28/2018    GA CYSTO/URETERO W/LITHOTRIPSY &INDWELL STENT INSRT Right 8/28/2018    Procedure: CYSTOSCOPY URETEROSCOPY WITH RETROGRADE PYELOGRAM AND INSERTION STENT URETERAL;  Surgeon: Jose Valdivia MD;  [facility];  Service: Urology    TOOTH EXTRACTION         Family History   Problem Relation Age of Onset    Diabetes Mother     Hyperlipidemia Mother     Stroke Mother     Heart disease Mother     Nephrolithiasis Father     Nephrolithiasis Brother      I have reviewed and agree with the history as documented      Social History   Substance Use Topics    Smoking status: Current Every Low cholesterol and low carbohydrate diet is advised.    Day Smoker     Packs/day: 1 00     Types: Cigarettes    Smokeless tobacco: Never Used    Alcohol use No        Review of Systems   Constitutional: Positive for activity change, appetite change and fatigue  Negative for diaphoresis and fever  HENT: Positive for congestion  Negative for drooling, facial swelling, rhinorrhea, sinus pain, sinus pressure, sore throat, trouble swallowing and voice change  Eyes: Negative  Respiratory: Positive for cough and wheezing  Negative for chest tightness, shortness of breath and stridor  Cardiovascular: Negative  Negative for chest pain, palpitations and leg swelling  Gastrointestinal: Negative  Negative for abdominal pain, anal bleeding, blood in stool, diarrhea, nausea and vomiting  Endocrine: Negative  Genitourinary: Negative  Negative for difficulty urinating, dysuria, frequency, hematuria, urgency, vaginal bleeding, vaginal discharge and vaginal pain  Musculoskeletal: Positive for back pain  Negative for neck pain and neck stiffness  Skin: Negative  Negative for rash and wound  Allergic/Immunologic: Negative  Neurological: Positive for weakness  Negative for dizziness, tremors, seizures, syncope, facial asymmetry, speech difficulty, light-headedness, numbness and headaches  Hematological: Negative  Does not bruise/bleed easily  Psychiatric/Behavioral: Negative  Negative for confusion  Physical Exam  Physical Exam   Constitutional: She is oriented to person, place, and time  She appears well-developed and well-nourished  Nontoxic appearance without respiratory distress  Patient looks comfortable sitting upright in the stretcher  HENT:   Head: Normocephalic and atraumatic  Right Ear: External ear normal    Left Ear: External ear normal    Nose: Nose normal    Mouth/Throat: Oropharynx is clear and moist  No oropharyngeal exudate  Eyes: Pupils are equal, round, and reactive to light   Conjunctivae and EOM are normal  Right eye exhibits no discharge  Left eye exhibits no discharge  Neck: Normal range of motion  Neck supple  Cardiovascular: Normal rate, regular rhythm, normal heart sounds and intact distal pulses  Pulmonary/Chest: Effort normal  No respiratory distress  She has wheezes  She has no rales  She exhibits no tenderness  Upper airway rhonchi   Abdominal: Soft  Bowel sounds are normal  She exhibits no distension and no mass  There is no tenderness  There is no rebound and no guarding  No hernia  Musculoskeletal: Normal range of motion  She exhibits no edema, tenderness or deformity  Neurological: She is alert and oriented to person, place, and time  She has normal reflexes  She displays normal reflexes  No cranial nerve deficit or sensory deficit  She exhibits normal muscle tone  Coordination normal    Skin: Skin is warm and dry  No rash noted  No erythema  No pallor  Psychiatric: She has a normal mood and affect  Her behavior is normal  Judgment and thought content normal    Nursing note and vitals reviewed        Vital Signs  ED Triage Vitals [10/05/18 2119]   Temperature Pulse Respirations Blood Pressure SpO2   97 8 °F (36 6 °C) 94 16 155/84 98 %      Temp src Heart Rate Source Patient Position - Orthostatic VS BP Location FiO2 (%)   -- -- -- -- --      Pain Score       9           Vitals:    10/05/18 2119   BP: 155/84   Pulse: 94       Visual Acuity      ED Medications  Medications   acetaminophen (TYLENOL) tablet 650 mg (not administered)   ibuprofen (MOTRIN) tablet 800 mg (not administered)   azithromycin (ZITHROMAX) tablet 500 mg (not administered)   ondansetron (ZOFRAN-ODT) dispersible tablet 4 mg (not administered)   ipratropium (ATROVENT) 0 02 % inhalation solution 0 5 mg (0 5 mg Nebulization Given 10/5/18 2208)   albuterol inhalation solution 5 mg (5 mg Nebulization Given 10/5/18 2208)       Diagnostic Studies  Results Reviewed     Procedure Component Value Units Date/Time    POCT pregnancy, urine [[id number]]  (Normal) Resulted:  10/05/18 2207    Lab Status:  Final result Updated:  10/05/18 2207     EXT PREG TEST UR (Ref: Negative) NEGATIVE                 X-ray chest 2 views   ED Interpretation by Charles Stewart MD (10/05 2300)   No acute disease                 Procedures  Procedures       Phone Contacts  ED Phone Contact    ED Course  ED Course as of Oct 05 2314   Fri Oct 05, 2018   2306 Patient is stable for discharge  Will treat with Zithromax for an upper respiratory infection despite a normal chest x-ray  It is possible this is a viral illness  I did explain that to the patient  Tylenol Motrin and Zithromax given  I discussed signs and symptoms that would require return to the emergency department  MDM  CritCare Time    Disposition  Final diagnoses:   Upper respiratory infection     Time reflects when diagnosis was documented in both MDM as applicable and the Disposition within this note     Time User Action Codes Description Comment    10/5/2018 11:07 PM Joellen Mckeon Add [J06 9] Upper respiratory infection       ED Disposition     ED Disposition Condition Comment    Discharge  [facility] discharge to home/self care  Condition at discharge: Stable        Follow-up Information     Follow up With Specialties Details [address] physician  Schedule an appointment as soon as possible for a visit            Patient's Medications   Discharge Prescriptions    AZITHROMYCIN (ZITHROMAX) 250 MG TABLET    Take 1 tablet (250 mg total) by mouth daily for 5 days Take first 2 tablets together, then 1 every day until finished         Start Date: 10/5/2018 End Date: 10/10/2018       Order Dose: 250 mg       Quantity: 6 tablet    Refills: 0    ONDANSETRON (ZOFRAN-ODT) 4 MG DISINTEGRATING TABLET    Take 1 tablet (4 mg total) by mouth every 6 (six) hours as needed for nausea or vomiting       Start Date: 10/5/2018 End Date: --       Order Dose: 4 mg Quantity: 20 tablet    Refills: 0     No discharge procedures on file      ED Provider  Electronically Signed by           Hugo Garcia MD  10/05/18 9449

## 2024-10-30 ENCOUNTER — APPOINTMENT (OUTPATIENT)
Dept: GASTROENTEROLOGY | Facility: HOSPITAL | Age: 54
End: 2024-10-30
Payer: COMMERCIAL

## 2025-01-05 DIAGNOSIS — I10 BENIGN ESSENTIAL HYPERTENSION: ICD-10-CM

## 2025-01-06 RX ORDER — LISINOPRIL 20 MG/1
20 TABLET ORAL DAILY
Qty: 90 TABLET | Refills: 3 | Status: SHIPPED | OUTPATIENT
Start: 2025-01-06

## 2025-02-07 ENCOUNTER — TELEMEDICINE (OUTPATIENT)
Dept: PRIMARY CARE | Facility: CLINIC | Age: 55
End: 2025-02-07
Payer: COMMERCIAL

## 2025-02-07 VITALS — WEIGHT: 264.3 LBS | BODY MASS INDEX: 43.98 KG/M2

## 2025-02-07 DIAGNOSIS — E78.2 MIXED HYPERLIPIDEMIA: ICD-10-CM

## 2025-02-07 DIAGNOSIS — J32.9 SINUSITIS, UNSPECIFIED CHRONICITY, UNSPECIFIED LOCATION: Primary | ICD-10-CM

## 2025-02-07 DIAGNOSIS — R73.9 HYPERGLYCEMIA: ICD-10-CM

## 2025-02-07 DIAGNOSIS — I10 BENIGN ESSENTIAL HYPERTENSION: ICD-10-CM

## 2025-02-07 PROCEDURE — 1036F TOBACCO NON-USER: CPT | Performed by: INTERNAL MEDICINE

## 2025-02-07 PROCEDURE — 99213 OFFICE O/P EST LOW 20 MIN: CPT | Performed by: INTERNAL MEDICINE

## 2025-02-07 RX ORDER — AMOXICILLIN 875 MG/1
875 TABLET, FILM COATED ORAL 2 TIMES DAILY
Qty: 20 TABLET | Refills: 0 | Status: SHIPPED | OUTPATIENT
Start: 2025-02-07 | End: 2025-02-17

## 2025-02-07 ASSESSMENT — ENCOUNTER SYMPTOMS
SLEEP DISTURBANCE: 0
NECK STIFFNESS: 0
BLOOD IN STOOL: 0
SORE THROAT: 0
SINUS PRESSURE: 0
PSYCHIATRIC NEGATIVE: 1
POLYPHAGIA: 0
AGITATION: 0
COUGH: 1
NERVOUS/ANXIOUS: 0
BRUISES/BLEEDS EASILY: 0
MYALGIAS: 0
NEUROLOGICAL NEGATIVE: 1
STRIDOR: 0
DIZZINESS: 0
DIFFICULTY URINATING: 0
FLANK PAIN: 0
EYES NEGATIVE: 1
SEIZURES: 0
POLYDIPSIA: 0
ABDOMINAL PAIN: 0
MUSCULOSKELETAL NEGATIVE: 1
BACK PAIN: 0
CONFUSION: 0
CHEST TIGHTNESS: 0
LIGHT-HEADEDNESS: 0
COLOR CHANGE: 0
GASTROINTESTINAL NEGATIVE: 1
ALLERGIC/IMMUNOLOGIC NEGATIVE: 1
WEAKNESS: 0
ENDOCRINE NEGATIVE: 1
PALPITATIONS: 0
ACTIVITY CHANGE: 0
NECK PAIN: 0
VOICE CHANGE: 0
UNEXPECTED WEIGHT CHANGE: 0
HEADACHES: 0
SPEECH DIFFICULTY: 0
WHEEZING: 0
SHORTNESS OF BREATH: 0
DYSURIA: 0
HEMATOLOGIC/LYMPHATIC NEGATIVE: 1
NAUSEA: 0
WOUND: 0
FATIGUE: 1
EYE REDNESS: 0
TREMORS: 0
EYE DISCHARGE: 0
HALLUCINATIONS: 0
CONSTIPATION: 0
TROUBLE SWALLOWING: 0
CARDIOVASCULAR NEGATIVE: 1
EYE PAIN: 0
SINUS PAIN: 0
VOMITING: 0
ARTHRALGIAS: 0
FREQUENCY: 0
APPETITE CHANGE: 0
ADENOPATHY: 0
FACIAL ASYMMETRY: 0
NUMBNESS: 0
JOINT SWELLING: 0
DIARRHEA: 0

## 2025-02-07 NOTE — ASSESSMENT & PLAN NOTE
Start antibiotic as directed.   You can take OTC (over the counter) Tylenol up to 2000 mg daily in divided doses as needed and/or  OTC medications: CORICIDIN HBP  for symptoms control.   Go to ER if feeling short of breath or having other worrisome symptoms (e.g. not able to keep food and fluids down, feeling dehydrated, etc).

## 2025-02-07 NOTE — PROGRESS NOTES
Subjective   Patient ID: Lexi Vanegas is a 55 y.o. female who presents for Virtual Visit (Pt is here due to virtual visit for congestion and cough and fatigue ).    HPI     This is 54 yo patient with h/o HTN, HLD, hyperglycemia, fatty liver, obesity.    She has not been feeling well for more than a week.  Sx started with sore throat, fatigue, then head and chest congestion, productive cough, low grade fever.  Did home COVID test which was negative.  Tried OTC allergy/cold medications without improvement.  C/o yellow drainage fro her nose, coughing up yellow phlegm.    Due to HTN we discussed avoiding OTC decongestants.     Review of Systems   Constitutional:  Positive for fatigue. Negative for activity change, appetite change and unexpected weight change.   HENT:  Positive for congestion. Negative for ear discharge, ear pain, hearing loss, mouth sores, nosebleeds, sinus pressure, sinus pain, sore throat, trouble swallowing and voice change.    Eyes: Negative.  Negative for pain, discharge, redness and visual disturbance.   Respiratory:  Positive for cough. Negative for chest tightness, shortness of breath, wheezing and stridor.    Cardiovascular: Negative.  Negative for chest pain, palpitations and leg swelling.   Gastrointestinal: Negative.  Negative for abdominal pain, blood in stool, constipation, diarrhea, nausea and vomiting.   Endocrine: Negative.  Negative for polydipsia, polyphagia and polyuria.   Genitourinary: Negative.  Negative for difficulty urinating, dysuria, flank pain, frequency and urgency.   Musculoskeletal: Negative.  Negative for arthralgias, back pain, gait problem, joint swelling, myalgias, neck pain and neck stiffness.   Skin: Negative.  Negative for color change, rash and wound.   Allergic/Immunologic: Negative.  Negative for environmental allergies, food allergies and immunocompromised state.   Neurological: Negative.  Negative for dizziness, tremors, seizures, syncope, facial asymmetry,  speech difficulty, weakness, light-headedness, numbness and headaches.   Hematological: Negative.  Negative for adenopathy. Does not bruise/bleed easily.   Psychiatric/Behavioral: Negative.  Negative for agitation, behavioral problems, confusion, hallucinations, sleep disturbance and suicidal ideas. The patient is not nervous/anxious.    All other systems reviewed and are negative.      Objective   Wt 120 kg (264 lb 4.8 oz)   BMI 43.98 kg/m²     Physical Exam  Constitutional:       Appearance: Normal appearance.   Pulmonary:      Effort: Pulmonary effort is normal.   Neurological:      Mental Status: She is alert and oriented to person, place, and time.   Psychiatric:         Mood and Affect: Mood normal.         Thought Content: Thought content normal.         Judgment: Judgment normal.         Assessment/Plan   Problem List Items Addressed This Visit             ICD-10-CM       Cardiac and Vasculature    Benign essential hypertension I10     Controlled. Continue same medications and DASH diet..         Hyperlipidemia E78.5     Low cholesterol and low carbohydrate diet is advised.             ENT    Sinusitis - Primary J32.9     Start antibiotic as directed.   You can take OTC (over the counter) Tylenol up to 2000 mg daily in divided doses as needed and/or  OTC medications: CORICIDIN HBP  for symptoms control.   Go to ER if feeling short of breath or having other worrisome symptoms (e.g. not able to keep food and fluids down, feeling dehydrated, etc).            Endocrine/Metabolic    Hyperglycemia R73.9     Low cholesterol and low carbohydrate diet is advised.              Pt is here due to virtual visit for congestion and cough and fatigue   Virtual or Telephone Consent    An interactive audio and video telecommunication system which permits real time communications between the patient (at the originating site) and provider (at the distant site) was utilized to provide this telehealth service.   Verbal consent  was requested and obtained from Lexi Vanegas on this date, 02/07/25 for a telehealth visit.       It was a pleasure to see you today.  I would like to remind you about importance of a healthy lifestyle in order to improve your well-being and live longer.  Try to engage in physical activities for at least 150 minutes per week.  Eat about 10 servings of fruits and vegetables daily. My advice is 2 servings of fruits and 8 servings of vegetables.  For vegetables choose at least half of them green and at least half of them fresh.  Please avoid sugar, salt, fried food and saturated fat.    I spent a total of 22 minutes on the date of service for follow up visit, which included preparing to see the patient, face-to-face patient care, completing clinical documentation, obtaining and/or reviewing separately obtained history, performing a medically appropriate examination, counseling and educating the patient/family/caregiver, ordering medications, tests, or procedures, communicating with other health care providers (not separately reported), independently interpreting results (not separately reported), communicating results to the patient/family/caregiver, and care coordination (not separately reported).    F/up as scheduled or sooner if needed.

## 2025-02-07 NOTE — PROGRESS NOTES
Pt is here due to virtual visit for congestion and cough and fatigue   Virtual or Telephone Consent    An interactive audio and video telecommunication system which permits real time communications between the patient (at the originating site) and provider (at the distant site) was utilized to provide this telehealth service.   Verbal consent was requested and obtained from Lexi Vanegas on this date, 02/07/25 for a telehealth visit.

## 2025-04-25 ENCOUNTER — APPOINTMENT (OUTPATIENT)
Dept: PRIMARY CARE | Facility: CLINIC | Age: 55
End: 2025-04-25
Payer: COMMERCIAL

## 2025-07-11 ENCOUNTER — APPOINTMENT (OUTPATIENT)
Dept: PRIMARY CARE | Facility: CLINIC | Age: 55
End: 2025-07-11
Payer: COMMERCIAL

## 2025-07-11 VITALS
HEART RATE: 113 BPM | HEIGHT: 65 IN | DIASTOLIC BLOOD PRESSURE: 78 MMHG | BODY MASS INDEX: 44.65 KG/M2 | TEMPERATURE: 97.5 F | WEIGHT: 268 LBS | OXYGEN SATURATION: 97 % | SYSTOLIC BLOOD PRESSURE: 121 MMHG | RESPIRATION RATE: 18 BRPM

## 2025-07-11 DIAGNOSIS — R73.9 HYPERGLYCEMIA: ICD-10-CM

## 2025-07-11 DIAGNOSIS — E66.813 CLASS 3 SEVERE OBESITY WITH SERIOUS COMORBIDITY AND BODY MASS INDEX (BMI) OF 40.0 TO 44.9 IN ADULT, UNSPECIFIED OBESITY TYPE: ICD-10-CM

## 2025-07-11 DIAGNOSIS — E78.2 MIXED HYPERLIPIDEMIA: ICD-10-CM

## 2025-07-11 DIAGNOSIS — R06.83 SNORING: ICD-10-CM

## 2025-07-11 DIAGNOSIS — I10 BENIGN ESSENTIAL HYPERTENSION: Primary | ICD-10-CM

## 2025-07-11 DIAGNOSIS — K76.0 STEATOSIS OF LIVER: ICD-10-CM

## 2025-07-11 DIAGNOSIS — R53.83 OTHER FATIGUE: ICD-10-CM

## 2025-07-11 PROCEDURE — 99214 OFFICE O/P EST MOD 30 MIN: CPT | Performed by: INTERNAL MEDICINE

## 2025-07-11 PROCEDURE — 1036F TOBACCO NON-USER: CPT | Performed by: INTERNAL MEDICINE

## 2025-07-11 PROCEDURE — 3008F BODY MASS INDEX DOCD: CPT | Performed by: INTERNAL MEDICINE

## 2025-07-11 PROCEDURE — 3078F DIAST BP <80 MM HG: CPT | Performed by: INTERNAL MEDICINE

## 2025-07-11 PROCEDURE — 3074F SYST BP LT 130 MM HG: CPT | Performed by: INTERNAL MEDICINE

## 2025-07-11 ASSESSMENT — ENCOUNTER SYMPTOMS
APPETITE CHANGE: 0
EYE REDNESS: 0
WHEEZING: 0
ABDOMINAL PAIN: 0
SINUS PAIN: 0
EYE DISCHARGE: 0
HEADACHES: 0
DIZZINESS: 0
MYALGIAS: 0
SPEECH DIFFICULTY: 0
SORE THROAT: 0
DIFFICULTY URINATING: 0
FACIAL ASYMMETRY: 0
HALLUCINATIONS: 0
TROUBLE SWALLOWING: 0
UNEXPECTED WEIGHT CHANGE: 0
COLOR CHANGE: 0
GASTROINTESTINAL NEGATIVE: 1
STRIDOR: 0
SLEEP DISTURBANCE: 0
NECK STIFFNESS: 0
BRUISES/BLEEDS EASILY: 0
BLOOD IN STOOL: 0
DIARRHEA: 0
CONFUSION: 0
FREQUENCY: 0
SEIZURES: 0
EYES NEGATIVE: 1
BACK PAIN: 0
ARTHRALGIAS: 0
CHEST TIGHTNESS: 0
AGITATION: 0
PALPITATIONS: 0
VOMITING: 0
PSYCHIATRIC NEGATIVE: 1
NAUSEA: 0
SHORTNESS OF BREATH: 0
POLYDIPSIA: 0
WOUND: 0
TREMORS: 0
LIGHT-HEADEDNESS: 0
ENDOCRINE NEGATIVE: 1
RESPIRATORY NEGATIVE: 1
EYE PAIN: 0
NEUROLOGICAL NEGATIVE: 1
COUGH: 0
CARDIOVASCULAR NEGATIVE: 1
JOINT SWELLING: 0
FLANK PAIN: 0
VOICE CHANGE: 0
ADENOPATHY: 0
MUSCULOSKELETAL NEGATIVE: 1
CONSTIPATION: 0
POLYPHAGIA: 0
WEAKNESS: 0
NECK PAIN: 0
ALLERGIC/IMMUNOLOGIC NEGATIVE: 1
NUMBNESS: 0
SINUS PRESSURE: 0
DYSURIA: 0
CONSTITUTIONAL NEGATIVE: 1
NERVOUS/ANXIOUS: 0
HEMATOLOGIC/LYMPHATIC NEGATIVE: 1
ACTIVITY CHANGE: 0

## 2025-07-11 ASSESSMENT — PATIENT HEALTH QUESTIONNAIRE - PHQ9
SUM OF ALL RESPONSES TO PHQ9 QUESTIONS 1 AND 2: 0
1. LITTLE INTEREST OR PLEASURE IN DOING THINGS: NOT AT ALL
2. FEELING DOWN, DEPRESSED OR HOPELESS: NOT AT ALL

## 2025-07-11 ASSESSMENT — PAIN SCALES - GENERAL: PAINLEVEL_OUTOF10: 0-NO PAIN

## 2025-07-11 NOTE — PROGRESS NOTES
Subjective   Patient ID: Lexi Vanegas is a 55 y.o. female who presents for Follow-up (6 month f/u).    HPI     Patient has been feeling pretty good and has been complaint with prescribed medications.    We reviewed and discussed details of recent blood work: CBC, CMP, TSH, Lipid panel, Hb A1c, Vit D done in .  Results within acceptable range except elevated glucose, cholesterol, TG.  Reminded about blood work for .    She has been feeling tired, remains under a lot of stress. She has been snoring at nights. Not sure about sleep apnea episodes.  We will obtain home sleep test to evaluate for sleep apnea.    Obesity counseling:  Patient has been having difficulties losing weight.  Patient's BMI is above 30.   Patient has tried low calorie, low carbohydrate diet and exercise program for more than 6 months without success.  We discussed weight loss subject at length, low calorie and low carbohydrate diet advised with goal of 1500 amber/day and 50g carbohydrates per day.   Details explained.   Patient agreed to try above along with performing physical activities/exercise for at least 150 minutes per week.   Patient was offered consultation with dietician to assist with weight loss goals.  We discussed FDA approved pharmacological treatments to be used as treatment intensification along with lifestyle modifications for weight loss  .  Will follow up in 3-6 months to check on the progress of weight loss.  Time for counselin minutes.    [facility] at  [facility] in May [facility] with c/o neck spasms.   Diagnosed with right torticollis.  Treated with ketorolac, diazepam and lidocaine patch- her sx improved.         Review of Systems   Constitutional: Negative.  Negative for activity change, appetite change and unexpected weight change.   HENT: Negative.  Negative for congestion, ear discharge, ear pain, hearing loss, mouth sores, nosebleeds, sinus pressure, sinus pain, sore throat, trouble swallowing and voice  "change.    Eyes: Negative.  Negative for pain, discharge, redness and visual disturbance.   Respiratory: Negative.  Negative for cough, chest tightness, shortness of breath, wheezing and stridor.    Cardiovascular: Negative.  Negative for chest pain, palpitations and leg swelling.   Gastrointestinal: Negative.  Negative for abdominal pain, blood in stool, constipation, diarrhea, nausea and vomiting.   Endocrine: Negative.  Negative for polydipsia, polyphagia and polyuria.   Genitourinary: Negative.  Negative for difficulty urinating, dysuria, flank pain, frequency and urgency.   Musculoskeletal: Negative.  Negative for arthralgias, back pain, gait problem, joint swelling, myalgias, neck pain and neck stiffness.   Skin: Negative.  Negative for color change, rash and wound.   Allergic/Immunologic: Negative.  Negative for environmental allergies, food allergies and immunocompromised state.   Neurological: Negative.  Negative for dizziness, tremors, seizures, syncope, facial asymmetry, speech difficulty, weakness, light-headedness, numbness and headaches.   Hematological: Negative.  Negative for adenopathy. Does not bruise/bleed easily.   Psychiatric/Behavioral: Negative.  Negative for agitation, behavioral problems, confusion, hallucinations, sleep disturbance and suicidal ideas. The patient is not nervous/anxious.    All other systems reviewed and are negative.      Objective   /78 (BP Location: Left arm, Patient Position: Sitting, BP Cuff Size: Adult)   Pulse (!) 113   Temp 36.4 °C (97.5 °F) (Skin)   Resp 18   Ht 1.651 m (5' 5\")   Wt 122 kg (268 lb)   SpO2 97%   BMI 44.60 kg/m²     Physical Exam  Vitals and nursing note reviewed.   Constitutional:       General: She is not in acute distress.     Appearance: Normal appearance.   HENT:      Head: Normocephalic and atraumatic.      Right Ear: External ear normal.      Left Ear: External ear normal.      Nose: Nose normal. No congestion or rhinorrhea. "   Eyes:      General:         Right eye: No discharge.         Left eye: No discharge.      Extraocular Movements: Extraocular movements intact.      Conjunctiva/sclera: Conjunctivae normal.      Pupils: Pupils are equal, round, and reactive to light.   Cardiovascular:      Rate and Rhythm: Normal rate and regular rhythm.      Pulses: Normal pulses.      Heart sounds: Normal heart sounds. No murmur heard.     No friction rub. No gallop.   Pulmonary:      Effort: Pulmonary effort is normal. No respiratory distress.      Breath sounds: Normal breath sounds. No stridor. No wheezing, rhonchi or rales.   Chest:      Chest wall: No tenderness.   Abdominal:      General: Bowel sounds are normal.      Palpations: Abdomen is soft. There is no mass.      Tenderness: There is no abdominal tenderness. There is no guarding or rebound.   Musculoskeletal:         General: No swelling or deformity. Normal range of motion.      Cervical back: Normal range of motion and neck supple. No rigidity or tenderness.      Right lower leg: No edema.      Left lower leg: No edema.   Lymphadenopathy:      Cervical: No cervical adenopathy.   Skin:     General: Skin is warm and dry.      Coloration: Skin is not jaundiced.      Findings: No bruising or erythema.   Neurological:      General: No focal deficit present.      Mental Status: She is alert and oriented to person, place, and time. Mental status is at baseline.      Cranial Nerves: No cranial nerve deficit.      Motor: No weakness.      Coordination: Coordination normal.      Gait: Gait normal.   Psychiatric:         Mood and Affect: Mood normal.         Behavior: Behavior normal.         Thought Content: Thought content normal.         Judgment: Judgment normal.         Assessment/Plan   Problem List Items Addressed This Visit           ICD-10-CM       Cardiac and Vasculature    Benign essential hypertension - Primary I10    Controlled. Continue same medications and DASH diet..          Hyperlipidemia E78.5    Low cholesterol and low carbohydrate diet is advised.             Endocrine/Metabolic    Hyperglycemia R73.9    Low cholesterol and low carbohydrate diet is advised.          Obesity E66.9    Weight loss is advised. Target BMI: 25. Please follow low carbohydrate diet and exercise at least 150 minutes weekly.  Nutritional consultation is available, please let me know if interested.          Relevant Orders    Home sleep apnea test (HSAT)       Gastrointestinal and Abdominal    Steatosis of liver K76.0    Low cholesterol and low carbohydrate diet is advised.   F/up with hepatologist Dr. Walsh.            Sleep    Snoring R06.83    Relevant Orders    Home sleep apnea test (HSAT)       Symptoms and Signs    Other fatigue R53.83    Relevant Orders    Home sleep apnea test (HSAT)   It was a pleasure to see you today.  I would like to remind you about importance of a healthy lifestyle in order to improve your well-being and live longer.  Try to engage in physical activities for at least 150 minutes per week.  Eat about 10 servings of fruits and vegetables daily. My advice is 2 servings of fruits and 8 servings of vegetables.  For vegetables choose at least half of them green and at least half of them fresh.  Please avoid sugar, salt, fried food and saturated fat.    I spent a total of 30 minutes on the date of service for follow up visit, which included preparing to see the patient, face-to-face patient care, completing clinical documentation, obtaining and/or reviewing separately obtained history, performing a medically appropriate examination, counseling and educating the patient/family/caregiver, ordering medications, tests, or procedures, communicating with other health care providers (not separately reported), independently interpreting results (not separately reported), communicating results to the patient/family/caregiver, and care coordination (not separately reported).    F/up in 6 months  or sooner if needed.

## 2025-07-24 ENCOUNTER — CLINICAL SUPPORT (OUTPATIENT)
Dept: SLEEP MEDICINE | Facility: HOSPITAL | Age: 55
End: 2025-07-24
Payer: COMMERCIAL

## 2025-07-24 DIAGNOSIS — R53.83 OTHER FATIGUE: ICD-10-CM

## 2025-07-24 DIAGNOSIS — E66.813 CLASS 3 SEVERE OBESITY WITH SERIOUS COMORBIDITY AND BODY MASS INDEX (BMI) OF 40.0 TO 44.9 IN ADULT, UNSPECIFIED OBESITY TYPE: ICD-10-CM

## 2025-07-24 DIAGNOSIS — R06.83 SNORING: ICD-10-CM

## 2025-07-24 PROCEDURE — 95806 SLEEP STUDY UNATT&RESP EFFT: CPT | Performed by: PSYCHIATRY & NEUROLOGY

## 2025-08-08 DIAGNOSIS — G47.33 OSA (OBSTRUCTIVE SLEEP APNEA): Primary | ICD-10-CM

## 2025-08-15 DIAGNOSIS — I10 BENIGN ESSENTIAL HYPERTENSION: ICD-10-CM

## 2025-08-15 RX ORDER — HYDROCHLOROTHIAZIDE 25 MG/1
25 TABLET ORAL DAILY
Qty: 90 TABLET | Refills: 3 | Status: SHIPPED | OUTPATIENT
Start: 2025-08-15

## 2026-01-23 ENCOUNTER — APPOINTMENT (OUTPATIENT)
Dept: PRIMARY CARE | Facility: CLINIC | Age: 56
End: 2026-01-23
Payer: COMMERCIAL